# Patient Record
Sex: FEMALE | Race: WHITE | NOT HISPANIC OR LATINO | URBAN - METROPOLITAN AREA
[De-identification: names, ages, dates, MRNs, and addresses within clinical notes are randomized per-mention and may not be internally consistent; named-entity substitution may affect disease eponyms.]

---

## 2017-05-11 ENCOUNTER — EMERGENCY (EMERGENCY)
Facility: HOSPITAL | Age: 32
LOS: 0 days | Discharge: HOME | End: 2017-05-12

## 2017-06-28 DIAGNOSIS — R11.0 NAUSEA: ICD-10-CM

## 2017-06-28 DIAGNOSIS — R10.2 PELVIC AND PERINEAL PAIN: ICD-10-CM

## 2017-06-28 DIAGNOSIS — R10.30 LOWER ABDOMINAL PAIN, UNSPECIFIED: ICD-10-CM

## 2018-06-23 ENCOUNTER — TRANSCRIPTION ENCOUNTER (OUTPATIENT)
Age: 33
End: 2018-06-23

## 2018-06-26 ENCOUNTER — EMERGENCY (EMERGENCY)
Facility: HOSPITAL | Age: 33
LOS: 0 days | Discharge: HOME | End: 2018-06-27
Attending: EMERGENCY MEDICINE | Admitting: EMERGENCY MEDICINE

## 2018-06-26 VITALS
WEIGHT: 136.03 LBS | DIASTOLIC BLOOD PRESSURE: 92 MMHG | HEIGHT: 65 IN | RESPIRATION RATE: 20 BRPM | SYSTOLIC BLOOD PRESSURE: 132 MMHG | HEART RATE: 69 BPM | OXYGEN SATURATION: 99 % | TEMPERATURE: 98 F

## 2018-06-26 DIAGNOSIS — R07.9 CHEST PAIN, UNSPECIFIED: ICD-10-CM

## 2018-06-26 DIAGNOSIS — Z79.2 LONG TERM (CURRENT) USE OF ANTIBIOTICS: ICD-10-CM

## 2018-06-26 DIAGNOSIS — R07.89 OTHER CHEST PAIN: ICD-10-CM

## 2018-06-26 RX ORDER — SODIUM CHLORIDE 9 MG/ML
3 INJECTION INTRAMUSCULAR; INTRAVENOUS; SUBCUTANEOUS ONCE
Qty: 0 | Refills: 0 | Status: COMPLETED | OUTPATIENT
Start: 2018-06-26 | End: 2018-06-26

## 2018-06-26 RX ORDER — KETOROLAC TROMETHAMINE 30 MG/ML
15 SYRINGE (ML) INJECTION ONCE
Qty: 0 | Refills: 0 | Status: DISCONTINUED | OUTPATIENT
Start: 2018-06-26 | End: 2018-06-26

## 2018-06-26 RX ORDER — ASPIRIN/CALCIUM CARB/MAGNESIUM 324 MG
325 TABLET ORAL ONCE
Qty: 0 | Refills: 0 | Status: COMPLETED | OUTPATIENT
Start: 2018-06-26 | End: 2018-06-26

## 2018-06-26 RX ORDER — NITROFURANTOIN MACROCRYSTAL 50 MG
1 CAPSULE ORAL
Qty: 0 | Refills: 0 | COMMUNITY

## 2018-06-26 RX ADMIN — Medication 325 MILLIGRAM(S): at 23:46

## 2018-06-26 RX ADMIN — SODIUM CHLORIDE 3 MILLILITER(S): 9 INJECTION INTRAMUSCULAR; INTRAVENOUS; SUBCUTANEOUS at 23:46

## 2018-06-26 RX ADMIN — Medication 15 MILLIGRAM(S): at 23:46

## 2018-06-26 NOTE — ED PROVIDER NOTE - OBJECTIVE STATEMENT
33 year old female states that she has chest pain left sided that goes down her arm for the last 30min. patient states that its gotten better since emergency room arrival. no leg pain or swelling no shortness of breath. no palpiations.

## 2018-06-26 NOTE — ED PROVIDER NOTE - CHPI ED SYMPTOMS NEG
no shortness of breath/no fever/no cough/no syncope/no chills/no dizziness/no back pain/no vomiting/no nausea/no diaphoresis

## 2018-06-26 NOTE — ED PROVIDER NOTE - NS ED ROS FT
Constitutional: (-) fever  Eyes/ENT: (-) blurry vision, (-) epistaxis  Cardiovascular: (+) chest pain, (-) syncope  Respiratory: (-) cough, (-) shortness of breath  Gastrointestinal: (-) vomiting, (-) diarrhea  Musculoskeletal: (-) neck pain, (-) back pain, (-) joint pain  Integumentary: (-) rash, (-) edema  Neurological: (-) headache, (-) altered mental status  Psychiatric: (-) hallucinations  Allergic/Immunologic: (-) pruritus

## 2018-06-26 NOTE — ED PROVIDER NOTE - ATTENDING CONTRIBUTION TO CARE
33yoF with h/o larissa otherwise healthy presents with L-sided CP, constant, worsened by lifting her arm and not by exertion, x30 minutes prior to arrival. Associated SOB. No leg pain/swelling, recent travel, OCP use, or any other symptoms. On exam, afebrile, hemodynamically stable, saturating well, NAD, anxious, otherwise well appearing, no increased WOB, head NCAT, EOMI grossly, anicteric, MMM, no JVD, RRR, nml S1/S2, no m/r/g, lungs CTAB, no w/r/r, abd soft, NT, ND, nml BS, no rebound or guarding, AAO, CN's 3-12 grossly intact, ORTEGA spontaneously, no leg cyanosis or edema, skin warm, well perfused, no rashes or hives. Character low suspicion for ACS, HEART score 1 for FHx of MI in the 60's, ECG/trop _____. Character low suspicion for PE and PERC negative. Character low suspicion dissection and no murmur or pulse asymmetry. No e/o PTX or PNA. Given ASA/Toradol. Well appearing, hemodynamically stable. 33yoF with h/o larissa otherwise healthy presents with L-sided CP, constant, worsened by lifting her arm and not by exertion, x30 minutes prior to arrival. Associated SOB. No leg pain/swelling, recent travel, OCP use, or any other symptoms. On exam, afebrile, hemodynamically stable, saturating well, NAD, anxious, otherwise well appearing, no increased WOB, head NCAT, EOMI grossly, anicteric, MMM, no JVD, RRR, nml S1/S2, no m/r/g, lungs CTAB, no w/r/r, abd soft, NT, ND, nml BS, no rebound or guarding, AAO, CN's 3-12 grossly intact, ORTEGA spontaneously, no leg cyanosis or edema, skin warm, well perfused, no rashes or hives. Character low suspicion for ACS, HEART score 1 for FHx of MI in the 60's, ECG unremarkable, trop pending. Character low suspicion for PE and PERC negative. Character low suspicion dissection and no murmur or pulse asymmetry. No e/o PTX or PNA. Given ASA/Toradol. Well appearing, hemodynamically stable. Signed off care at this time Dr. JOEL Puckett who will f/u on trop and anticipate d/c with PMD f/u, pt comfortable with this plan.

## 2018-06-27 VITALS
DIASTOLIC BLOOD PRESSURE: 86 MMHG | OXYGEN SATURATION: 98 % | SYSTOLIC BLOOD PRESSURE: 126 MMHG | RESPIRATION RATE: 20 BRPM | TEMPERATURE: 98 F | HEART RATE: 70 BPM

## 2018-06-27 LAB
ALBUMIN SERPL ELPH-MCNC: 4.4 G/DL — SIGNIFICANT CHANGE UP (ref 3.5–5.2)
ALP SERPL-CCNC: 58 U/L — SIGNIFICANT CHANGE UP (ref 30–115)
ALT FLD-CCNC: 15 U/L — SIGNIFICANT CHANGE UP (ref 0–41)
ANION GAP SERPL CALC-SCNC: 15 MMOL/L — HIGH (ref 7–14)
AST SERPL-CCNC: 17 U/L — SIGNIFICANT CHANGE UP (ref 0–41)
BASOPHILS # BLD AUTO: 0.03 K/UL — SIGNIFICANT CHANGE UP (ref 0–0.2)
BASOPHILS NFR BLD AUTO: 0.4 % — SIGNIFICANT CHANGE UP (ref 0–1)
BILIRUB SERPL-MCNC: 0.3 MG/DL — SIGNIFICANT CHANGE UP (ref 0.2–1.2)
BUN SERPL-MCNC: 17 MG/DL — SIGNIFICANT CHANGE UP (ref 10–20)
CALCIUM SERPL-MCNC: 9 MG/DL — SIGNIFICANT CHANGE UP (ref 8.5–10.1)
CHLORIDE SERPL-SCNC: 103 MMOL/L — SIGNIFICANT CHANGE UP (ref 98–110)
CK SERPL-CCNC: 85 U/L — SIGNIFICANT CHANGE UP (ref 0–225)
CO2 SERPL-SCNC: 21 MMOL/L — SIGNIFICANT CHANGE UP (ref 17–32)
CREAT SERPL-MCNC: 0.8 MG/DL — SIGNIFICANT CHANGE UP (ref 0.7–1.5)
EOSINOPHIL # BLD AUTO: 0.24 K/UL — SIGNIFICANT CHANGE UP (ref 0–0.7)
EOSINOPHIL NFR BLD AUTO: 3.2 % — SIGNIFICANT CHANGE UP (ref 0–8)
GLUCOSE SERPL-MCNC: 106 MG/DL — HIGH (ref 70–99)
HCT VFR BLD CALC: 37.5 % — SIGNIFICANT CHANGE UP (ref 37–47)
HGB BLD-MCNC: 12.8 G/DL — SIGNIFICANT CHANGE UP (ref 12–16)
IMM GRANULOCYTES NFR BLD AUTO: 0.4 % — HIGH (ref 0.1–0.3)
LYMPHOCYTES # BLD AUTO: 3.27 K/UL — SIGNIFICANT CHANGE UP (ref 1.2–3.4)
LYMPHOCYTES # BLD AUTO: 43.3 % — SIGNIFICANT CHANGE UP (ref 20.5–51.1)
MCHC RBC-ENTMCNC: 30.3 PG — SIGNIFICANT CHANGE UP (ref 27–31)
MCHC RBC-ENTMCNC: 34.1 G/DL — SIGNIFICANT CHANGE UP (ref 32–37)
MCV RBC AUTO: 88.7 FL — SIGNIFICANT CHANGE UP (ref 81–99)
MONOCYTES # BLD AUTO: 0.52 K/UL — SIGNIFICANT CHANGE UP (ref 0.1–0.6)
MONOCYTES NFR BLD AUTO: 6.9 % — SIGNIFICANT CHANGE UP (ref 1.7–9.3)
NEUTROPHILS # BLD AUTO: 3.47 K/UL — SIGNIFICANT CHANGE UP (ref 1.4–6.5)
NEUTROPHILS NFR BLD AUTO: 45.8 % — SIGNIFICANT CHANGE UP (ref 42.2–75.2)
NRBC # BLD: 0 /100 WBCS — SIGNIFICANT CHANGE UP (ref 0–0)
PLATELET # BLD AUTO: 265 K/UL — SIGNIFICANT CHANGE UP (ref 130–400)
POTASSIUM SERPL-MCNC: 3.9 MMOL/L — SIGNIFICANT CHANGE UP (ref 3.5–5)
POTASSIUM SERPL-SCNC: 3.9 MMOL/L — SIGNIFICANT CHANGE UP (ref 3.5–5)
PROT SERPL-MCNC: 7.1 G/DL — SIGNIFICANT CHANGE UP (ref 6–8)
RBC # BLD: 4.23 M/UL — SIGNIFICANT CHANGE UP (ref 4.2–5.4)
RBC # FLD: 11.9 % — SIGNIFICANT CHANGE UP (ref 11.5–14.5)
SODIUM SERPL-SCNC: 139 MMOL/L — SIGNIFICANT CHANGE UP (ref 135–146)
TROPONIN T SERPL-MCNC: <0.01 NG/ML — SIGNIFICANT CHANGE UP
WBC # BLD: 7.56 K/UL — SIGNIFICANT CHANGE UP (ref 4.8–10.8)
WBC # FLD AUTO: 7.56 K/UL — SIGNIFICANT CHANGE UP (ref 4.8–10.8)

## 2018-08-01 PROBLEM — Z00.00 ENCOUNTER FOR PREVENTIVE HEALTH EXAMINATION: Status: ACTIVE | Noted: 2018-08-01

## 2018-09-24 ENCOUNTER — APPOINTMENT (OUTPATIENT)
Dept: OBGYN | Facility: CLINIC | Age: 33
End: 2018-09-24

## 2019-09-04 ENCOUNTER — EMERGENCY (EMERGENCY)
Facility: HOSPITAL | Age: 34
LOS: 0 days | Discharge: HOME | End: 2019-09-05
Attending: EMERGENCY MEDICINE | Admitting: EMERGENCY MEDICINE
Payer: MEDICAID

## 2019-09-04 VITALS
HEIGHT: 65 IN | DIASTOLIC BLOOD PRESSURE: 62 MMHG | HEART RATE: 67 BPM | RESPIRATION RATE: 20 BRPM | SYSTOLIC BLOOD PRESSURE: 120 MMHG | OXYGEN SATURATION: 100 % | WEIGHT: 134.92 LBS | TEMPERATURE: 98 F

## 2019-09-04 VITALS
RESPIRATION RATE: 18 BRPM | TEMPERATURE: 97 F | HEART RATE: 62 BPM | DIASTOLIC BLOOD PRESSURE: 65 MMHG | SYSTOLIC BLOOD PRESSURE: 103 MMHG | OXYGEN SATURATION: 100 %

## 2019-09-04 DIAGNOSIS — R10.9 UNSPECIFIED ABDOMINAL PAIN: ICD-10-CM

## 2019-09-04 DIAGNOSIS — N83.201 UNSPECIFIED OVARIAN CYST, RIGHT SIDE: ICD-10-CM

## 2019-09-04 DIAGNOSIS — R10.2 PELVIC AND PERINEAL PAIN: ICD-10-CM

## 2019-09-04 DIAGNOSIS — R10.32 LEFT LOWER QUADRANT PAIN: ICD-10-CM

## 2019-09-04 LAB
ALBUMIN SERPL ELPH-MCNC: 4.4 G/DL — SIGNIFICANT CHANGE UP (ref 3.5–5.2)
ALP SERPL-CCNC: 53 U/L — SIGNIFICANT CHANGE UP (ref 30–115)
ALT FLD-CCNC: 7 U/L — SIGNIFICANT CHANGE UP (ref 0–41)
ANION GAP SERPL CALC-SCNC: 8 MMOL/L — SIGNIFICANT CHANGE UP (ref 7–14)
APPEARANCE UR: CLEAR — SIGNIFICANT CHANGE UP
APTT BLD: 30.7 SEC — SIGNIFICANT CHANGE UP (ref 27–39.2)
AST SERPL-CCNC: 13 U/L — SIGNIFICANT CHANGE UP (ref 0–41)
BACTERIA # UR AUTO: ABNORMAL
BASOPHILS # BLD AUTO: 0.04 K/UL — SIGNIFICANT CHANGE UP (ref 0–0.2)
BASOPHILS NFR BLD AUTO: 0.6 % — SIGNIFICANT CHANGE UP (ref 0–1)
BILIRUB SERPL-MCNC: 0.4 MG/DL — SIGNIFICANT CHANGE UP (ref 0.2–1.2)
BILIRUB UR-MCNC: NEGATIVE — SIGNIFICANT CHANGE UP
BUN SERPL-MCNC: 15 MG/DL — SIGNIFICANT CHANGE UP (ref 10–20)
CALCIUM SERPL-MCNC: 9.4 MG/DL — SIGNIFICANT CHANGE UP (ref 8.5–10.1)
CHLORIDE SERPL-SCNC: 102 MMOL/L — SIGNIFICANT CHANGE UP (ref 98–110)
CO2 SERPL-SCNC: 26 MMOL/L — SIGNIFICANT CHANGE UP (ref 17–32)
COLOR SPEC: YELLOW — SIGNIFICANT CHANGE UP
CREAT SERPL-MCNC: 0.6 MG/DL — LOW (ref 0.7–1.5)
DIFF PNL FLD: NEGATIVE — SIGNIFICANT CHANGE UP
EOSINOPHIL # BLD AUTO: 0.07 K/UL — SIGNIFICANT CHANGE UP (ref 0–0.7)
EOSINOPHIL NFR BLD AUTO: 1.1 % — SIGNIFICANT CHANGE UP (ref 0–8)
EPI CELLS # UR: ABNORMAL /HPF
GLUCOSE SERPL-MCNC: 102 MG/DL — HIGH (ref 70–99)
GLUCOSE UR QL: NEGATIVE MG/DL — SIGNIFICANT CHANGE UP
HCT VFR BLD CALC: 35.4 % — LOW (ref 37–47)
HGB BLD-MCNC: 11.7 G/DL — LOW (ref 12–16)
IMM GRANULOCYTES NFR BLD AUTO: 0.2 % — SIGNIFICANT CHANGE UP (ref 0.1–0.3)
INR BLD: 1.08 RATIO — SIGNIFICANT CHANGE UP (ref 0.65–1.3)
KETONES UR-MCNC: NEGATIVE — SIGNIFICANT CHANGE UP
LACTATE SERPL-SCNC: 1.1 MMOL/L — SIGNIFICANT CHANGE UP (ref 0.5–2.2)
LEUKOCYTE ESTERASE UR-ACNC: ABNORMAL
LIDOCAIN IGE QN: 41 U/L — SIGNIFICANT CHANGE UP (ref 7–60)
LYMPHOCYTES # BLD AUTO: 2.48 K/UL — SIGNIFICANT CHANGE UP (ref 1.2–3.4)
LYMPHOCYTES # BLD AUTO: 39.8 % — SIGNIFICANT CHANGE UP (ref 20.5–51.1)
MAGNESIUM SERPL-MCNC: 1.9 MG/DL — SIGNIFICANT CHANGE UP (ref 1.8–2.4)
MCHC RBC-ENTMCNC: 29.3 PG — SIGNIFICANT CHANGE UP (ref 27–31)
MCHC RBC-ENTMCNC: 33.1 G/DL — SIGNIFICANT CHANGE UP (ref 32–37)
MCV RBC AUTO: 88.7 FL — SIGNIFICANT CHANGE UP (ref 81–99)
MONOCYTES # BLD AUTO: 0.5 K/UL — SIGNIFICANT CHANGE UP (ref 0.1–0.6)
MONOCYTES NFR BLD AUTO: 8 % — SIGNIFICANT CHANGE UP (ref 1.7–9.3)
NEUTROPHILS # BLD AUTO: 3.13 K/UL — SIGNIFICANT CHANGE UP (ref 1.4–6.5)
NEUTROPHILS NFR BLD AUTO: 50.3 % — SIGNIFICANT CHANGE UP (ref 42.2–75.2)
NITRITE UR-MCNC: NEGATIVE — SIGNIFICANT CHANGE UP
NRBC # BLD: 0 /100 WBCS — SIGNIFICANT CHANGE UP (ref 0–0)
PH UR: 7 — SIGNIFICANT CHANGE UP (ref 5–8)
PLATELET # BLD AUTO: 283 K/UL — SIGNIFICANT CHANGE UP (ref 130–400)
POTASSIUM SERPL-MCNC: 4.1 MMOL/L — SIGNIFICANT CHANGE UP (ref 3.5–5)
POTASSIUM SERPL-SCNC: 4.1 MMOL/L — SIGNIFICANT CHANGE UP (ref 3.5–5)
PROT SERPL-MCNC: 6.7 G/DL — SIGNIFICANT CHANGE UP (ref 6–8)
PROT UR-MCNC: NEGATIVE MG/DL — SIGNIFICANT CHANGE UP
PROTHROM AB SERPL-ACNC: 12.4 SEC — SIGNIFICANT CHANGE UP (ref 9.95–12.87)
RBC # BLD: 3.99 M/UL — LOW (ref 4.2–5.4)
RBC # FLD: 12.2 % — SIGNIFICANT CHANGE UP (ref 11.5–14.5)
RBC CASTS # UR COMP ASSIST: SIGNIFICANT CHANGE UP /HPF
SODIUM SERPL-SCNC: 136 MMOL/L — SIGNIFICANT CHANGE UP (ref 135–146)
SP GR SPEC: 1.01 — SIGNIFICANT CHANGE UP (ref 1.01–1.03)
UROBILINOGEN FLD QL: 0.2 MG/DL — SIGNIFICANT CHANGE UP (ref 0.2–0.2)
WBC # BLD: 6.23 K/UL — SIGNIFICANT CHANGE UP (ref 4.8–10.8)
WBC # FLD AUTO: 6.23 K/UL — SIGNIFICANT CHANGE UP (ref 4.8–10.8)
WBC UR QL: SIGNIFICANT CHANGE UP /HPF

## 2019-09-04 PROCEDURE — 74177 CT ABD & PELVIS W/CONTRAST: CPT | Mod: 26

## 2019-09-04 PROCEDURE — 99285 EMERGENCY DEPT VISIT HI MDM: CPT

## 2019-09-04 PROCEDURE — 76856 US EXAM PELVIC COMPLETE: CPT | Mod: 26

## 2019-09-04 RX ORDER — KETOROLAC TROMETHAMINE 30 MG/ML
15 SYRINGE (ML) INJECTION ONCE
Refills: 0 | Status: DISCONTINUED | OUTPATIENT
Start: 2019-09-04 | End: 2019-09-04

## 2019-09-04 RX ORDER — MORPHINE SULFATE 50 MG/1
4 CAPSULE, EXTENDED RELEASE ORAL ONCE
Refills: 0 | Status: DISCONTINUED | OUTPATIENT
Start: 2019-09-04 | End: 2019-09-04

## 2019-09-04 RX ORDER — SODIUM CHLORIDE 9 MG/ML
1000 INJECTION INTRAMUSCULAR; INTRAVENOUS; SUBCUTANEOUS ONCE
Refills: 0 | Status: COMPLETED | OUTPATIENT
Start: 2019-09-04 | End: 2019-09-04

## 2019-09-04 RX ADMIN — SODIUM CHLORIDE 1000 MILLILITER(S): 9 INJECTION INTRAMUSCULAR; INTRAVENOUS; SUBCUTANEOUS at 19:39

## 2019-09-04 RX ADMIN — MORPHINE SULFATE 4 MILLIGRAM(S): 50 CAPSULE, EXTENDED RELEASE ORAL at 19:39

## 2019-09-04 NOTE — ED ADULT TRIAGE NOTE - CHIEF COMPLAINT QUOTE
pt stated " c/o lower abdominal and lower back pain for the past two days, it was the worst today" pt took Ibuprofen 600mg 1 hour ago.

## 2019-09-04 NOTE — ED PROVIDER NOTE - NSFOLLOWUPINSTRUCTIONS_ED_ALL_ED_FT
Abdominal Pain, Adult  ImageMany things can cause belly (abdominal) pain. Most times, belly pain is not dangerous. Many cases of belly pain can be watched and treated at home. Sometimes belly pain is serious, though. Your doctor will try to find the cause of your belly pain.    Follow these instructions at home:  Take over-the-counter and prescription medicines only as told by your doctor. Do not take medicines that help you poop (laxatives) unless told to by your doctor.  Drink enough fluid to keep your pee (urine) clear or pale yellow.  Watch your belly pain for any changes.  Keep all follow-up visits as told by your doctor. This is important.  Contact a doctor if:  Your belly pain changes or gets worse.  You are not hungry, or you lose weight without trying.  You are having trouble pooping (constipated) or have watery poop (diarrhea) for more than 2–3 days.  You have pain when you pee or poop.  Your belly pain wakes you up at night.  Your pain gets worse with meals, after eating, or with certain foods.  You are throwing up and cannot keep anything down.  You have a fever.  Get help right away if:  Your pain does not go away as soon as your doctor says it should.  You cannot stop throwing up.  Your pain is only in areas of your belly, such as the right side or the left lower part of the belly.  You have bloody or black poop, or poop that looks like tar.  You have very bad pain, cramping, or bloating in your belly.  You have signs of not having enough fluid or water in your body (dehydration), such as:    Dark pee, very little pee, or no pee.  Cracked lips.  Dry mouth.  Sunken eyes.  Sleepiness.  Weakness.    This information is not intended to replace advice given to you by your health care provider. Make sure you discuss any questions you have with your health care provider.    Document Released: 06/05/2009 Document Revised: 07/07/2017 Document Reviewed: 05/31/2017  [employer] Interactive Patient Education © 2018 [employer]

## 2019-09-04 NOTE — ED PROVIDER NOTE - ATTENDING CONTRIBUTION TO CARE
33 yo F , LMP 1 week ago presents with c/o left sided lower abd pain x 3 days.  Pt now radiating toward her back.  Pt with h/o back pain in past, but this feels similar,  + nausea, no vomiting, no diarrhea, no urinary complaints, no fever or chills.  no vaginal d/c.  On exam pt in NAD AAO x 3, MMM, abd is soft ND, + tender LLQ, no rebound or guarding, no CVA tenderness, no rash

## 2019-09-04 NOTE — ED PROVIDER NOTE - CLINICAL SUMMARY MEDICAL DECISION MAKING FREE TEXT BOX
pt pw  left pelvic  pain US , ovarian cs 1.3 cm right side,  CT ap  constipation  , no  inflammatory process -   Patient to be discharged from ED. Any available test results were discussed with and printed  for patient.  Verbal instructions given, including instructions to return to ED immediately for any new, worsening, or concerning symptoms. Patient reports understanding of above with capacity and insight. Written discharge instructions additionally given, including follow-up plan.

## 2019-09-04 NOTE — ED PROVIDER NOTE - PATIENT PORTAL LINK FT
You can access the FollowMyHealth Patient Portal offered by [facility] by registering at the following website: [email or web address]. By joining [employer]’s FollowMyHealth portal, you will also be able to view your health information using other applications (apps) compatible with our system.

## 2019-09-04 NOTE — ED PROVIDER NOTE - OBJECTIVE STATEMENT
Patient c/o of LLQ abdominal pain, started 3 days ago  now radiating to lower back, + nausea, no vaginal discharge or bleeding, No fever, LMP finished 2 days ago and was normal.

## 2019-09-04 NOTE — ED PROVIDER NOTE - PROGRESS NOTE DETAILS
signed out Dr Puckett pt  well appearing  asymptomatic - awaiting  CT  results  abd soft nontender   discussed results  that are  back with patient

## 2019-09-05 RX ADMIN — Medication 15 MILLIGRAM(S): at 00:21

## 2019-09-12 ENCOUNTER — APPOINTMENT (OUTPATIENT)
Dept: OBGYN | Facility: CLINIC | Age: 34
End: 2019-09-12
Payer: COMMERCIAL

## 2019-09-12 VITALS
SYSTOLIC BLOOD PRESSURE: 110 MMHG | HEIGHT: 65 IN | DIASTOLIC BLOOD PRESSURE: 66 MMHG | WEIGHT: 134 LBS | BODY MASS INDEX: 22.33 KG/M2

## 2019-09-12 DIAGNOSIS — Z01.411 ENCOUNTER FOR GYNECOLOGICAL EXAMINATION (GENERAL) (ROUTINE) WITH ABNORMAL FINDINGS: ICD-10-CM

## 2019-09-12 DIAGNOSIS — K59.00 CONSTIPATION, UNSPECIFIED: ICD-10-CM

## 2019-09-12 DIAGNOSIS — R10.2 PELVIC AND PERINEAL PAIN: ICD-10-CM

## 2019-09-12 DIAGNOSIS — A74.9 CHLAMYDIAL INFECTION, UNSPECIFIED: ICD-10-CM

## 2019-09-12 DIAGNOSIS — N83.201 UNSPECIFIED OVARIAN CYST, RIGHT SIDE: ICD-10-CM

## 2019-09-12 PROCEDURE — 99213 OFFICE O/P EST LOW 20 MIN: CPT | Mod: 25

## 2019-09-12 PROCEDURE — 99395 PREV VISIT EST AGE 18-39: CPT

## 2019-09-12 PROCEDURE — 76830 TRANSVAGINAL US NON-OB: CPT

## 2019-09-12 RX ORDER — DOXYCYCLINE 100 MG/1
100 CAPSULE ORAL
Qty: 60 | Refills: 0 | Status: COMPLETED | COMMUNITY
Start: 2019-06-10

## 2019-09-12 RX ORDER — AMOXICILLIN AND CLAVULANATE POTASSIUM 875; 125 MG/1; MG/1
875-125 TABLET, COATED ORAL
Qty: 14 | Refills: 0 | Status: COMPLETED | COMMUNITY
Start: 2019-06-25

## 2019-09-12 RX ORDER — AZITHROMYCIN 500 MG/1
500 TABLET, FILM COATED ORAL
Qty: 4 | Refills: 0 | Status: COMPLETED | COMMUNITY
Start: 2019-09-07

## 2019-09-12 RX ORDER — MUPIROCIN 20 MG/G
2 OINTMENT TOPICAL
Qty: 22 | Refills: 0 | Status: COMPLETED | COMMUNITY
Start: 2019-06-28

## 2019-09-12 RX ORDER — TRETINOIN 0.5 MG/G
0.05 CREAM TOPICAL
Qty: 20 | Refills: 0 | Status: COMPLETED | COMMUNITY
Start: 2019-07-23

## 2019-09-12 RX ORDER — TRETINOIN 0.25 MG/G
0.03 CREAM TOPICAL
Qty: 20 | Refills: 0 | Status: COMPLETED | COMMUNITY
Start: 2019-06-10

## 2019-09-12 RX ORDER — ALPRAZOLAM 1 MG/1
1 TABLET ORAL
Qty: 60 | Refills: 0 | Status: ACTIVE | COMMUNITY
Start: 2019-07-22

## 2019-09-12 RX ORDER — AZITHROMYCIN 250 MG/1
250 TABLET, FILM COATED ORAL
Qty: 6 | Refills: 0 | Status: COMPLETED | COMMUNITY
Start: 2019-05-21

## 2019-09-12 RX ORDER — CLINDAMYCIN PHOSPHATE 10 MG/ML
1 LOTION TOPICAL
Qty: 60 | Refills: 0 | Status: COMPLETED | COMMUNITY
Start: 2019-06-10

## 2019-09-12 RX ORDER — DOXYCYCLINE HYCLATE 100 MG/1
100 TABLET ORAL
Qty: 14 | Refills: 0 | Status: COMPLETED | COMMUNITY
Start: 2019-09-12 | End: 2019-09-19

## 2019-09-12 RX ORDER — GENTAMICIN SULFATE 1 MG/G
0.1 OINTMENT TOPICAL
Qty: 15 | Refills: 0 | Status: COMPLETED | COMMUNITY
Start: 2019-07-23

## 2019-09-12 RX ORDER — NITROFURANTOIN (MONOHYDRATE/MACROCRYSTALS) 25; 75 MG/1; MG/1
100 CAPSULE ORAL
Qty: 14 | Refills: 0 | Status: COMPLETED | COMMUNITY
Start: 2019-07-10

## 2019-09-12 NOTE — PROCEDURE
[Liquid Base] : liquid base [Cervical Pap Smear] : cervical Pap smear [No Complications] : there were no complications [Tolerated Well] : the patient tolerated the procedure well [Pelvic Pain] : pelvic pain [Transvaginal Ultrasound] : transvaginal ultrasound [Present] : uterus present [Anteverted] : anteverted [L: ___ cm] : L: [unfilled] cm [W: ___cm] : W: [unfilled] cm [FreeTextEntry6] : Right ovarian cyst 1.2 x 1.6 cm [FreeTextEntry7] : 2.1 x 3.7 cm [FreeTextEntry8] : 1.8 x 3.1 cm

## 2019-09-19 ENCOUNTER — APPOINTMENT (OUTPATIENT)
Dept: OBGYN | Facility: CLINIC | Age: 34
End: 2019-09-19
Payer: MEDICAID

## 2019-09-19 VITALS — SYSTOLIC BLOOD PRESSURE: 112 MMHG | BODY MASS INDEX: 22.3 KG/M2 | WEIGHT: 134 LBS | DIASTOLIC BLOOD PRESSURE: 74 MMHG

## 2019-09-19 DIAGNOSIS — R87.615 UNSATISFACTORY CYTOLOGIC SMEAR OF CERVIX: ICD-10-CM

## 2019-09-19 PROCEDURE — XXXXX: CPT

## 2019-09-19 RX ORDER — SULFAMETHOXAZOLE AND TRIMETHOPRIM 800; 160 MG/1; MG/1
800-160 TABLET ORAL
Qty: 20 | Refills: 0 | Status: ACTIVE | COMMUNITY
Start: 2019-09-08

## 2019-09-19 RX ORDER — PHENAZOPYRIDINE HYDROCHLORIDE 200 MG/1
200 TABLET ORAL
Qty: 6 | Refills: 0 | Status: ACTIVE | COMMUNITY
Start: 2019-09-11

## 2019-09-19 RX ORDER — METRONIDAZOLE 7.5 MG/G
0.75 GEL VAGINAL
Qty: 70 | Refills: 0 | Status: ACTIVE | COMMUNITY
Start: 2019-09-11

## 2019-09-19 RX ORDER — MULTIVIT,STRESS FORMULA/ZINC
TABLET ORAL
Refills: 0 | Status: ACTIVE | COMMUNITY

## 2019-11-04 ENCOUNTER — APPOINTMENT (OUTPATIENT)
Dept: OBGYN | Facility: CLINIC | Age: 34
End: 2019-11-04

## 2019-12-12 ENCOUNTER — APPOINTMENT (OUTPATIENT)
Dept: OBGYN | Facility: CLINIC | Age: 34
End: 2019-12-12

## 2020-01-21 ENCOUNTER — APPOINTMENT (OUTPATIENT)
Dept: OBGYN | Facility: CLINIC | Age: 35
End: 2020-01-21
Payer: MEDICAID

## 2020-01-21 VITALS — SYSTOLIC BLOOD PRESSURE: 108 MMHG | WEIGHT: 142 LBS | DIASTOLIC BLOOD PRESSURE: 70 MMHG | BODY MASS INDEX: 23.63 KG/M2

## 2020-01-21 DIAGNOSIS — N94.6 DYSMENORRHEA, UNSPECIFIED: ICD-10-CM

## 2020-01-21 DIAGNOSIS — N92.6 IRREGULAR MENSTRUATION, UNSPECIFIED: ICD-10-CM

## 2020-01-21 DIAGNOSIS — Z30.011 ENCOUNTER FOR INITIAL PRESCRIPTION OF CONTRACEPTIVE PILLS: ICD-10-CM

## 2020-01-21 DIAGNOSIS — Z11.3 ENCOUNTER FOR SCREENING FOR INFECTIONS WITH A PREDOMINANTLY SEXUAL MODE OF TRANSMISSION: ICD-10-CM

## 2020-01-21 DIAGNOSIS — Z86.19 PERSONAL HISTORY OF OTHER INFECTIOUS AND PARASITIC DISEASES: ICD-10-CM

## 2020-01-21 DIAGNOSIS — N89.8 OTHER SPECIFIED NONINFLAMMATORY DISORDERS OF VAGINA: ICD-10-CM

## 2020-01-21 PROCEDURE — 99214 OFFICE O/P EST MOD 30 MIN: CPT

## 2020-01-21 RX ORDER — DROSPIRENONE AND ETHINYL ESTRADIOL 0.02-3(28)
3-0.02 KIT ORAL DAILY
Qty: 3 | Refills: 1 | Status: ACTIVE | COMMUNITY
Start: 2020-01-21 | End: 1900-01-01

## 2020-01-23 RX ORDER — NORETHINDRONE ACETATE AND ETHINYL ESTRADIOL AND FERROUS FUMARATE 1MG-20(21)
1-20 KIT ORAL DAILY
Qty: 3 | Refills: 1 | Status: ACTIVE | COMMUNITY
Start: 2020-01-23 | End: 1900-01-01

## 2020-02-06 NOTE — ED PROVIDER NOTE - NS ED ATTENDING STATEMENT MOD
Home
I have personally performed a face to face diagnostic evaluation on this patient. I have reviewed the ACP note and agree with the history, exam and plan of care, except as noted.

## 2020-02-09 ENCOUNTER — EMERGENCY (EMERGENCY)
Facility: HOSPITAL | Age: 35
LOS: 0 days | Discharge: HOME | End: 2020-02-09
Attending: EMERGENCY MEDICINE | Admitting: EMERGENCY MEDICINE
Payer: MEDICAID

## 2020-02-09 VITALS
RESPIRATION RATE: 18 BRPM | HEART RATE: 62 BPM | TEMPERATURE: 98 F | OXYGEN SATURATION: 99 % | SYSTOLIC BLOOD PRESSURE: 111 MMHG | DIASTOLIC BLOOD PRESSURE: 73 MMHG

## 2020-02-09 VITALS
SYSTOLIC BLOOD PRESSURE: 119 MMHG | TEMPERATURE: 99 F | HEIGHT: 65 IN | DIASTOLIC BLOOD PRESSURE: 80 MMHG | WEIGHT: 132.06 LBS | HEART RATE: 65 BPM | OXYGEN SATURATION: 100 % | RESPIRATION RATE: 18 BRPM

## 2020-02-09 DIAGNOSIS — M54.5 LOW BACK PAIN: ICD-10-CM

## 2020-02-09 DIAGNOSIS — R10.9 UNSPECIFIED ABDOMINAL PAIN: ICD-10-CM

## 2020-02-09 LAB
ALBUMIN SERPL ELPH-MCNC: 4.7 G/DL — SIGNIFICANT CHANGE UP (ref 3.5–5.2)
ALP SERPL-CCNC: 55 U/L — SIGNIFICANT CHANGE UP (ref 30–115)
ALT FLD-CCNC: 15 U/L — SIGNIFICANT CHANGE UP (ref 0–41)
ANION GAP SERPL CALC-SCNC: 12 MMOL/L — SIGNIFICANT CHANGE UP (ref 7–14)
APPEARANCE UR: CLEAR — SIGNIFICANT CHANGE UP
AST SERPL-CCNC: 15 U/L — SIGNIFICANT CHANGE UP (ref 0–41)
BACTERIA # UR AUTO: ABNORMAL
BASOPHILS # BLD AUTO: 0.02 K/UL — SIGNIFICANT CHANGE UP (ref 0–0.2)
BASOPHILS NFR BLD AUTO: 0.3 % — SIGNIFICANT CHANGE UP (ref 0–1)
BILIRUB SERPL-MCNC: 0.6 MG/DL — SIGNIFICANT CHANGE UP (ref 0.2–1.2)
BILIRUB UR-MCNC: NEGATIVE — SIGNIFICANT CHANGE UP
BUN SERPL-MCNC: 9 MG/DL — LOW (ref 10–20)
CALCIUM SERPL-MCNC: 9.1 MG/DL — SIGNIFICANT CHANGE UP (ref 8.5–10.1)
CHLORIDE SERPL-SCNC: 106 MMOL/L — SIGNIFICANT CHANGE UP (ref 98–110)
CO2 SERPL-SCNC: 23 MMOL/L — SIGNIFICANT CHANGE UP (ref 17–32)
COLOR SPEC: YELLOW — SIGNIFICANT CHANGE UP
CREAT SERPL-MCNC: 0.7 MG/DL — SIGNIFICANT CHANGE UP (ref 0.7–1.5)
DIFF PNL FLD: NEGATIVE — SIGNIFICANT CHANGE UP
EOSINOPHIL # BLD AUTO: 0.08 K/UL — SIGNIFICANT CHANGE UP (ref 0–0.7)
EOSINOPHIL NFR BLD AUTO: 1.2 % — SIGNIFICANT CHANGE UP (ref 0–8)
EPI CELLS # UR: ABNORMAL /HPF
GLUCOSE SERPL-MCNC: 99 MG/DL — SIGNIFICANT CHANGE UP (ref 70–99)
GLUCOSE UR QL: NEGATIVE MG/DL — SIGNIFICANT CHANGE UP
HCT VFR BLD CALC: 38.8 % — SIGNIFICANT CHANGE UP (ref 37–47)
HGB BLD-MCNC: 13 G/DL — SIGNIFICANT CHANGE UP (ref 12–16)
IMM GRANULOCYTES NFR BLD AUTO: 0.3 % — SIGNIFICANT CHANGE UP (ref 0.1–0.3)
KETONES UR-MCNC: NEGATIVE — SIGNIFICANT CHANGE UP
LEUKOCYTE ESTERASE UR-ACNC: ABNORMAL
LYMPHOCYTES # BLD AUTO: 2.06 K/UL — SIGNIFICANT CHANGE UP (ref 1.2–3.4)
LYMPHOCYTES # BLD AUTO: 31.1 % — SIGNIFICANT CHANGE UP (ref 20.5–51.1)
MCHC RBC-ENTMCNC: 29.7 PG — SIGNIFICANT CHANGE UP (ref 27–31)
MCHC RBC-ENTMCNC: 33.5 G/DL — SIGNIFICANT CHANGE UP (ref 32–37)
MCV RBC AUTO: 88.8 FL — SIGNIFICANT CHANGE UP (ref 81–99)
MONOCYTES # BLD AUTO: 0.49 K/UL — SIGNIFICANT CHANGE UP (ref 0.1–0.6)
MONOCYTES NFR BLD AUTO: 7.4 % — SIGNIFICANT CHANGE UP (ref 1.7–9.3)
NEUTROPHILS # BLD AUTO: 3.96 K/UL — SIGNIFICANT CHANGE UP (ref 1.4–6.5)
NEUTROPHILS NFR BLD AUTO: 59.7 % — SIGNIFICANT CHANGE UP (ref 42.2–75.2)
NITRITE UR-MCNC: NEGATIVE — SIGNIFICANT CHANGE UP
NRBC # BLD: 0 /100 WBCS — SIGNIFICANT CHANGE UP (ref 0–0)
PH UR: 5.5 — SIGNIFICANT CHANGE UP (ref 5–8)
PLATELET # BLD AUTO: 307 K/UL — SIGNIFICANT CHANGE UP (ref 130–400)
POTASSIUM SERPL-MCNC: 4.1 MMOL/L — SIGNIFICANT CHANGE UP (ref 3.5–5)
POTASSIUM SERPL-SCNC: 4.1 MMOL/L — SIGNIFICANT CHANGE UP (ref 3.5–5)
PROT SERPL-MCNC: 7 G/DL — SIGNIFICANT CHANGE UP (ref 6–8)
PROT UR-MCNC: NEGATIVE MG/DL — SIGNIFICANT CHANGE UP
RBC # BLD: 4.37 M/UL — SIGNIFICANT CHANGE UP (ref 4.2–5.4)
RBC # FLD: 12.2 % — SIGNIFICANT CHANGE UP (ref 11.5–14.5)
SODIUM SERPL-SCNC: 141 MMOL/L — SIGNIFICANT CHANGE UP (ref 135–146)
SP GR SPEC: >=1.03 (ref 1.01–1.03)
UROBILINOGEN FLD QL: 0.2 MG/DL — SIGNIFICANT CHANGE UP (ref 0.2–0.2)
WBC # BLD: 6.63 K/UL — SIGNIFICANT CHANGE UP (ref 4.8–10.8)
WBC # FLD AUTO: 6.63 K/UL — SIGNIFICANT CHANGE UP (ref 4.8–10.8)
WBC UR QL: SIGNIFICANT CHANGE UP /HPF

## 2020-02-09 PROCEDURE — 99284 EMERGENCY DEPT VISIT MOD MDM: CPT

## 2020-02-09 PROCEDURE — 76856 US EXAM PELVIC COMPLETE: CPT | Mod: 26

## 2020-02-09 RX ORDER — DEXAMETHASONE 0.5 MG/5ML
10 ELIXIR ORAL ONCE
Refills: 0 | Status: COMPLETED | OUTPATIENT
Start: 2020-02-09 | End: 2020-02-09

## 2020-02-09 RX ORDER — KETOROLAC TROMETHAMINE 30 MG/ML
30 SYRINGE (ML) INJECTION ONCE
Refills: 0 | Status: DISCONTINUED | OUTPATIENT
Start: 2020-02-09 | End: 2020-02-09

## 2020-02-09 RX ADMIN — Medication 10 MILLIGRAM(S): at 18:22

## 2020-02-09 RX ADMIN — Medication 30 MILLIGRAM(S): at 16:03

## 2020-02-09 NOTE — ED PROVIDER NOTE - PATIENT PORTAL LINK FT
You can access the FollowMyHealth Patient Portal offered by [facility] by registering at the following website: [email or web address]. By joining [employer]’s FollowMyHealth portal, you will also be able to view your health information using other applications (apps) compatible with our system.

## 2020-02-09 NOTE — ED PROVIDER NOTE - OBJECTIVE STATEMENT
33 y/o female presents to the Ed with left sided lower abdominal pain. patient LMP one week ago. patient with hx of ovarian cyst . patient seen in the Ed for similar in past. patient c/o left buttock pain sharp in nature and intermittent . patient denies any heavy lifting or tingling down leg. patient denies any saddle anesthesia or bladder incontinence. no dysuria

## 2020-02-09 NOTE — ED PROVIDER NOTE - NSFOLLOWUPINSTRUCTIONS_ED_ALL_ED_FT
Back Pain    Back pain is very common in adults. The cause of back pain is rarely dangerous and the pain often gets better over time. The cause of your back pain may not be known and may include strain of muscles or ligaments, degeneration of the spinal disks, or arthritis. Occasionally the pain may radiate down your leg(s). Over-the-counter medicines to reduce pain and inflammation are often the most helpful. Stretching and remaining active frequently helps the healing process.     SEEK IMMEDIATE MEDICAL CARE IF YOU HAVE ANY OF THE FOLLOWING SYMPTOMS: bowel or bladder control problems, unusual weakness or numbness in your arms or legs, nausea or vomiting, abdominal pain, fever, dizziness/lightheadedness.      Sciatica    WHAT YOU NEED TO KNOW:    Sciatica is a condition that causes pain along your sciatic nerve. The sciatic nerve runs from your spine through both sides of your buttocks. It then runs down the back of your thigh, into your lower leg and foot. Your sciatic nerve may be compressed, inflamed, irritated, or stretched.          DISCHARGE INSTRUCTIONS:    Medicines:     NSAIDs: These medicines decrease swelling and pain. NSAIDs are available without a doctor's order. Ask your healthcare provider which medicine is right for you. Ask how much to take and when to take it. Take as directed. NSAIDs can cause stomach bleeding or kidney problems if not taken correctly.      Acetaminophen: This medicine decreases pain. Acetaminophen is available without a doctor's order. Ask how much to take and when to take it. Follow directions. Acetaminophen can cause liver damage if not taken correctly.      Muscle relaxers help decrease pain and muscle spasms.      Take your medicine as directed. Contact your healthcare provider if you think your medicine is not helping or if you have side effects. Tell him of her if you are allergic to any medicine. Keep a list of the medicines, vitamins, and herbs you take. Include the amounts, and when and why you take them. Bring the list or the pill bottles to follow-up visits. Carry your medicine list with you in case of an emergency.    Follow up with your healthcare provider as directed: Write down your questions so you remember to ask them during your visits.     Manage your symptoms:     Activity: Decrease your activity. Do not lift heavy objects or twist your back for at least 6 weeks. Slowly return to your usual activity.      Ice: Ice helps decrease swelling and pain. Ice may also help prevent tissue damage. Use an ice pack, or put crushed ice in a plastic bag. Cover it with a towel and place it on your low back or leg for 15 to 20 minutes every hour or as directed.      Heat: Heat helps decrease pain and muscle spasms. Apply heat on the area for 20 to 30 minutes every 2 hours for as many days as directed.       Physical therapy: You may need to see physical therapist to teach you exercises to help improve movement and strength, and to decrease pain. An occupational therapist teaches you skills to help with your daily activities.       Use assistive devices if directed: You may need to wear back support, such as a back brace. You may need crutches, a cane, or a walker to decrease stress on your lower back and leg muscles. Ask your healthcare provider for more information about assistive devices and how to use them correctly.    Self-care:     Avoid pressure on your back and legs: Do not lift heavy objects, or stand or sit for long periods of time.      Lift objects safely: Keep your back straight and bend your knees when you  an object. Do not bend or twist your back when you lift.      Maintain a healthy weight: Ask your healthcare provider how much you should weigh. Ask him to help you create a weight loss plan if you are overweight.       Exercise: Ask your healthcare provider about the best stretching, warmup, and exercise plan for you.     Contact your healthcare provider if:     You have pain in your lower back at night or when resting.      You have pain in your lower back with numbness below the knee.      You have weakness in one leg only.      You have questions or concerns about your condition or care.    Return to the emergency department if:     You have trouble holding back your urine or bowel movements.      You have weakness in both legs.      You have numbness in your groin or buttocks.         © Copyright [employer] 2019 All illustrations and images included in CareNotes are the copyrighted property of [employer] Inc[employer] or [employer].

## 2020-02-09 NOTE — ED PROVIDER NOTE - CLINICAL SUMMARY MEDICAL DECISION MAKING FREE TEXT BOX
Results reviewed and d/w patient, copies given.  Pt will need to follow up with PMD, Rehab.  will likely need MRI. Pt instructed to return if any worsening symptoms or concerns.  They verbalize understanding.

## 2020-02-09 NOTE — ED PROVIDER NOTE - NSFOLLOWUPCLINICS_GEN_ALL_ED_FT
Neurosurgery at [place]  Neurosurgery  [address] [place] [address], [address]  [place] [address]  Phone: [phone]  Fax:   Follow Up Time:

## 2020-02-09 NOTE — ED PROVIDER NOTE - ATTENDING CONTRIBUTION TO CARE
33 yo F presents with c/o pain to left buttock , lower back and lower abd x months.  Pt was seen in ED for same a few months ago and had CT and sono.  Fund with ovarian cyst and constipation.  no n/v, no urinary complaints, no numbness or tingling, no h/o trauma.  On exam pt in NAD AAO x 3, ambulatory in ED,  + tender left lower back , left piriformis, no rash, abd soft nt nd, Ext atrumatic, FROM good tone, equal strength

## 2020-02-09 NOTE — ED PROVIDER NOTE - NS ED ROS FT
Review of Systems    Constitutional: (-) fever or chills  respiratory: (-) cough (-) shortness of breath  Cardiovascular: (-) syncope, palpitations or chest pain  Integumentary: (-) rash or painful lymph nodes  Neurological: (-) altered mental status, headache or head injury  msk: left buttock pain

## 2020-02-10 LAB
CULTURE RESULTS: SIGNIFICANT CHANGE UP
SPECIMEN SOURCE: SIGNIFICANT CHANGE UP

## 2020-10-13 NOTE — ED ADULT NURSE NOTE - CARDIO ASSESSMENT
[FreeTextEntry1] : This is a 75-year-old male whose history has been reviewed above\par \par He has history of hypertension his blood pressure is at goal he has no orthostasis no medication changes\par \par He has a history of ASHD his cardiac exam is unchanged he has a few basal rales no dyspnea no edema probably secondary to his mask no intervention\par \par He has history of hypercholesterolemia and is on a statin a cholesterol profile has been obtained medication changes predicated on the results\par \par He has a history of gout he remains on allopurinol uric acid has been obtained he has had no attacks no medication changes\par \par He remains on an SSRI with only moderate success. This is monitored by his wife. We will continue current plan.\par \par In terms of his low back pain I had offered him spinal service however he has declined. He is using local medication at this time WDL

## 2020-10-30 ENCOUNTER — TRANSCRIPTION ENCOUNTER (OUTPATIENT)
Age: 35
End: 2020-10-30

## 2020-12-16 NOTE — ED ADULT NURSE NOTE - NSIMPLEMENTINTERV_GEN_ALL_ED
No Implemented All Universal Safety Interventions:  [place] to call system. Call bell, personal items and telephone within reach. Instruct patient to call for assistance. Room bathroom lighting operational. Non-slip footwear when patient is off stretcher. Physically safe environment: no spills, clutter or unnecessary equipment. Stretcher in lowest position, wheels locked, appropriate side rails in place.

## 2022-02-14 ENCOUNTER — EMERGENCY (EMERGENCY)
Facility: HOSPITAL | Age: 37
LOS: 0 days | Discharge: HOME | End: 2022-02-14
Attending: EMERGENCY MEDICINE | Admitting: EMERGENCY MEDICINE
Payer: MEDICAID

## 2022-02-14 VITALS
TEMPERATURE: 97 F | DIASTOLIC BLOOD PRESSURE: 64 MMHG | SYSTOLIC BLOOD PRESSURE: 105 MMHG | WEIGHT: 134.92 LBS | HEIGHT: 65 IN | OXYGEN SATURATION: 100 % | RESPIRATION RATE: 18 BRPM | HEART RATE: 70 BPM

## 2022-02-14 VITALS
HEART RATE: 65 BPM | TEMPERATURE: 97 F | DIASTOLIC BLOOD PRESSURE: 70 MMHG | RESPIRATION RATE: 16 BRPM | SYSTOLIC BLOOD PRESSURE: 111 MMHG | OXYGEN SATURATION: 100 %

## 2022-02-14 DIAGNOSIS — R11.0 NAUSEA: ICD-10-CM

## 2022-02-14 DIAGNOSIS — R10.32 LEFT LOWER QUADRANT PAIN: ICD-10-CM

## 2022-02-14 DIAGNOSIS — Z20.822 CONTACT WITH AND (SUSPECTED) EXPOSURE TO COVID-19: ICD-10-CM

## 2022-02-14 LAB
ALBUMIN SERPL ELPH-MCNC: 4.7 G/DL — SIGNIFICANT CHANGE UP (ref 3.5–5.2)
ALP SERPL-CCNC: 68 U/L — SIGNIFICANT CHANGE UP (ref 30–115)
ALT FLD-CCNC: 10 U/L — SIGNIFICANT CHANGE UP (ref 0–41)
ANION GAP SERPL CALC-SCNC: 9 MMOL/L — SIGNIFICANT CHANGE UP (ref 7–14)
APPEARANCE UR: CLEAR — SIGNIFICANT CHANGE UP
AST SERPL-CCNC: 15 U/L — SIGNIFICANT CHANGE UP (ref 0–41)
BASOPHILS # BLD AUTO: 0.04 K/UL — SIGNIFICANT CHANGE UP (ref 0–0.2)
BASOPHILS NFR BLD AUTO: 0.6 % — SIGNIFICANT CHANGE UP (ref 0–1)
BILIRUB SERPL-MCNC: 0.2 MG/DL — SIGNIFICANT CHANGE UP (ref 0.2–1.2)
BILIRUB UR-MCNC: NEGATIVE — SIGNIFICANT CHANGE UP
BUN SERPL-MCNC: 9 MG/DL — LOW (ref 10–20)
CALCIUM SERPL-MCNC: 9.6 MG/DL — SIGNIFICANT CHANGE UP (ref 8.5–10.1)
CHLORIDE SERPL-SCNC: 104 MMOL/L — SIGNIFICANT CHANGE UP (ref 98–110)
CO2 SERPL-SCNC: 25 MMOL/L — SIGNIFICANT CHANGE UP (ref 17–32)
COLOR SPEC: YELLOW — SIGNIFICANT CHANGE UP
CREAT SERPL-MCNC: 0.6 MG/DL — LOW (ref 0.7–1.5)
DIFF PNL FLD: NEGATIVE — SIGNIFICANT CHANGE UP
EOSINOPHIL # BLD AUTO: 0.08 K/UL — SIGNIFICANT CHANGE UP (ref 0–0.7)
EOSINOPHIL NFR BLD AUTO: 1.2 % — SIGNIFICANT CHANGE UP (ref 0–8)
GLUCOSE SERPL-MCNC: 82 MG/DL — SIGNIFICANT CHANGE UP (ref 70–99)
GLUCOSE UR QL: NEGATIVE MG/DL — SIGNIFICANT CHANGE UP
HCG UR QL: NEGATIVE — SIGNIFICANT CHANGE UP
HCT VFR BLD CALC: 39.6 % — SIGNIFICANT CHANGE UP (ref 37–47)
HGB BLD-MCNC: 13.3 G/DL — SIGNIFICANT CHANGE UP (ref 12–16)
IMM GRANULOCYTES NFR BLD AUTO: 0.3 % — SIGNIFICANT CHANGE UP (ref 0.1–0.3)
KETONES UR-MCNC: NEGATIVE — SIGNIFICANT CHANGE UP
LACTATE SERPL-SCNC: 1.6 MMOL/L — SIGNIFICANT CHANGE UP (ref 0.7–2)
LEUKOCYTE ESTERASE UR-ACNC: NEGATIVE — SIGNIFICANT CHANGE UP
LIDOCAIN IGE QN: 34 U/L — SIGNIFICANT CHANGE UP (ref 7–60)
LYMPHOCYTES # BLD AUTO: 2.26 K/UL — SIGNIFICANT CHANGE UP (ref 1.2–3.4)
LYMPHOCYTES # BLD AUTO: 34.1 % — SIGNIFICANT CHANGE UP (ref 20.5–51.1)
MCHC RBC-ENTMCNC: 29.9 PG — SIGNIFICANT CHANGE UP (ref 27–31)
MCHC RBC-ENTMCNC: 33.6 G/DL — SIGNIFICANT CHANGE UP (ref 32–37)
MCV RBC AUTO: 89 FL — SIGNIFICANT CHANGE UP (ref 81–99)
MONOCYTES # BLD AUTO: 0.44 K/UL — SIGNIFICANT CHANGE UP (ref 0.1–0.6)
MONOCYTES NFR BLD AUTO: 6.6 % — SIGNIFICANT CHANGE UP (ref 1.7–9.3)
NEUTROPHILS # BLD AUTO: 3.79 K/UL — SIGNIFICANT CHANGE UP (ref 1.4–6.5)
NEUTROPHILS NFR BLD AUTO: 57.2 % — SIGNIFICANT CHANGE UP (ref 42.2–75.2)
NITRITE UR-MCNC: NEGATIVE — SIGNIFICANT CHANGE UP
NRBC # BLD: 0 /100 WBCS — SIGNIFICANT CHANGE UP (ref 0–0)
PH UR: 5.5 — SIGNIFICANT CHANGE UP (ref 5–8)
PLATELET # BLD AUTO: 300 K/UL — SIGNIFICANT CHANGE UP (ref 130–400)
POTASSIUM SERPL-MCNC: 3.8 MMOL/L — SIGNIFICANT CHANGE UP (ref 3.5–5)
POTASSIUM SERPL-SCNC: 3.8 MMOL/L — SIGNIFICANT CHANGE UP (ref 3.5–5)
PROT SERPL-MCNC: 6.7 G/DL — SIGNIFICANT CHANGE UP (ref 6–8)
PROT UR-MCNC: NEGATIVE MG/DL — SIGNIFICANT CHANGE UP
RBC # BLD: 4.45 M/UL — SIGNIFICANT CHANGE UP (ref 4.2–5.4)
RBC # FLD: 12 % — SIGNIFICANT CHANGE UP (ref 11.5–14.5)
SARS-COV-2 RNA SPEC QL NAA+PROBE: SIGNIFICANT CHANGE UP
SODIUM SERPL-SCNC: 138 MMOL/L — SIGNIFICANT CHANGE UP (ref 135–146)
SP GR SPEC: 1.02 — SIGNIFICANT CHANGE UP (ref 1.01–1.03)
UROBILINOGEN FLD QL: 0.2 MG/DL — SIGNIFICANT CHANGE UP
WBC # BLD: 6.63 K/UL — SIGNIFICANT CHANGE UP (ref 4.8–10.8)
WBC # FLD AUTO: 6.63 K/UL — SIGNIFICANT CHANGE UP (ref 4.8–10.8)

## 2022-02-14 PROCEDURE — 76856 US EXAM PELVIC COMPLETE: CPT | Mod: 26

## 2022-02-14 PROCEDURE — 74176 CT ABD & PELVIS W/O CONTRAST: CPT | Mod: 26,MA

## 2022-02-14 PROCEDURE — 99285 EMERGENCY DEPT VISIT HI MDM: CPT

## 2022-02-14 NOTE — ED PROVIDER NOTE - OBJECTIVE STATEMENT
36 F no pmhx presents tot the ED for nausea and LLQ abdominal pain for th elast 2 days pt states that the pain is a non radiating sharp pain made worse by pressing down in the area. pt denies any vomiting and has been able to eat and drink since the nausea and pain started. no recent sick contacts or precipitating events.

## 2022-02-14 NOTE — ED PROVIDER NOTE - CLINICAL SUMMARY MEDICAL DECISION MAKING FREE TEXT BOX
36-year-old female no past medical history presents with left lower quadrant pain x2 days sharp nonradiating no urinary changes associated with nausea last menstrual.  1 week ago no vaginal discharge or bleeding.  On exam patient alert well-appearing no jaundice CVS RRR respiratory CTA no tachypnea abdomen soft mild LLQ tenderness no rebound no guarding no rigidity labs reviewed within normal limitsPelvic US : Trace fluid within the cervical canal, nonspecific. ,Rightovarian 1.0 cm follicle. Left ovarian 2.2 cm cyst/follicle.  CT AP  No CT evidence of acute intra-abdominal pathology. Normal caliber   appendix.  Patient to be discharged from ED in well appearing condition. Any available test results were discussed with and printed  for patient.  Verbal instructions given, including instructions to return to ED immediately for any new, worsening, or concerning symptoms. Limitations of ED work up discussed.  Patient reports understanding of above with capacity and insight. Written discharge instructions additionally given, including follow-up plan.

## 2022-02-14 NOTE — ED PROVIDER NOTE - NSFOLLOWUPINSTRUCTIONS_ED_ALL_ED_FT
Follow up with your gyn   this week and your PMD 1-3 days     RETURN TO ED  FOR ANY NEW WORSENING OR CONCERNING SYMPTOMS TO YOU, ALSO AS WE DISCUSSED. WE ARE HERE AND HAPPY TO TAKE CARE OF YOU    OVARIAN CYST - Discharge Care     Ovarian Cyst    WHAT YOU NEED TO KNOW:    An ovarian cyst is a sac that grows on an ovary. This sac usually contains fluid, but may sometimes have blood or tissue in it. Most ovarian cysts are harmless and go away without treatment in a few months. Some cysts can grow large, cause pain, or break open.     DISCHARGE INSTRUCTIONS:    Call 911 for any of the following:     You are too weak or dizzy to stand up.        Seek care immediately if:     You have severe abdominal pain. The pain may be sharp and sudden.      You have a fever.    Contact your healthcare provider if:     Your periods are early, late, or more painful than usual.      You have bleeding from your vagina that is not your period.      You have abdominal pain all the time.      Your abdomen is swollen.      You have feelings of fullness, pressure, or discomfort in your abdomen.      You have trouble urinating or emptying your bladder completely.      You have pain during sex.      You are losing weight without trying.      You have questions or concerns about your condition or care.    Medicines: You may need any of the following:     NSAIDs, such as ibuprofen, help decrease swelling, pain, and fever. This medicine is available with or without a doctor's order. NSAIDs can cause stomach bleeding or kidney problems in certain people. If you take blood thinner medicine, always ask if NSAIDs are safe for you. Always read the medicine label and follow directions. Do not give these medicines to children under 6 months of age without direction from your child's healthcare provider.      Birth control pills may help to control your periods, prevent cysts, or cause them to shrink.      Take your medicine as directed. Contact your healthcare provider if you think your medicine is not helping or if you have side effects. Tell him or her if you are allergic to any medicine. Keep a list of the medicines, vitamins, and herbs you take. Include the amounts, and when and why you take them. Bring the list or the pill bottles to follow-up visits. Carry your medicine list with you in case of an emergency.    Follow up with your healthcare provider as directed: Write down your questions so you remember to ask them during your visits.     Apply heat to decrease pain and cramping: Sit in a warm bath, or place a heating pad (turned on low) or a hot water bottle on your abdomen. Do this for 15 to 20 minutes every hour for as many days as directed

## 2022-02-14 NOTE — ED PROVIDER NOTE - NS ED ROS FT
Review of Systems  Constitutional:  No fever, chills, malaise, generalized weakness.  Eyes:  No visual changes, eye pain, or discharge.  ENMT:  No hearing changes, pain, or discharge. No nasal congestion, discharge, or bleeding. No throat pain, swelling, or difficulty swallowing.  Cardiac:  No chest pain,   Respiratory:  No dyspnea,  GI:  + nausea, vomiting, - diarrhea, + abdominal pain. No melena or hematochezia.  :  No dysuria, hematuria, frequency, or burning. Normal urine output. No vaginal bleeding/discharge. LMP 3 days ago  MS:  No myalgia, muscle weakness, gait abnormality, joint swelling, joint pain, or back pain.  Skin:  No skin rash, pruritis, jaundice, or lesions.  Neuro:  No headache, dizziness,

## 2022-02-14 NOTE — ED PROVIDER NOTE - PROGRESS NOTE DETAILS
Spoke with CT pt is refusing CT contrast, because she does not want it in her body. plan to do dry scan. PA fellow note reviewed and agree,

## 2022-02-14 NOTE — ED PROVIDER NOTE - PATIENT PORTAL LINK FT
You can access the FollowMyHealth Patient Portal offered by [facility] by registering at the following website: [email or web address]. By joining [employer]’s FollowMyHealth portal, you will also be able to view your health information using other applications (apps) compatible with our system.

## 2022-02-14 NOTE — ED PROVIDER NOTE - PHYSICAL EXAMINATION
VITAL SIGNS: I have reviewed nursing notes and confirm.  CONSTITUTIONAL: Well-developed; well-nourished; in no acute distress.  SKIN: Skin exam is warm and dry, no acute rash.  EYES: PERRL, EOM intact; conjunctiva and sclera clear.  ENT: No nasal discharge; airway clear.   CARD: S1, S2 normal; no murmurs, gallops, or rubs. Regular rate and rhythm.  RESP: No wheezes, rales or rhonchi. Speaking in full sentences.   ABD: Normal bowel sounds; soft; non-distended; tender LLQ; No rebound or guarding. No CVA tenderness. negative psoas, obturator signs  EXT: Normal ROM. No clubbing, cyanosis or edema.  NEURO: Alert, oriented. Grossly unremarkable. No focal deficits.   PSYCH: Cooperative, appropriate.

## 2022-07-26 ENCOUNTER — EMERGENCY (EMERGENCY)
Facility: HOSPITAL | Age: 37
LOS: 0 days | Discharge: HOME | End: 2022-07-26
Attending: EMERGENCY MEDICINE | Admitting: EMERGENCY MEDICINE

## 2022-07-26 VITALS
HEIGHT: 65 IN | WEIGHT: 136.91 LBS | RESPIRATION RATE: 20 BRPM | OXYGEN SATURATION: 100 % | DIASTOLIC BLOOD PRESSURE: 73 MMHG | TEMPERATURE: 96 F | HEART RATE: 85 BPM | SYSTOLIC BLOOD PRESSURE: 118 MMHG

## 2022-07-26 DIAGNOSIS — R06.02 SHORTNESS OF BREATH: ICD-10-CM

## 2022-07-26 PROCEDURE — 93010 ELECTROCARDIOGRAM REPORT: CPT

## 2022-07-26 PROCEDURE — 71045 X-RAY EXAM CHEST 1 VIEW: CPT | Mod: 26

## 2022-07-26 PROCEDURE — 99284 EMERGENCY DEPT VISIT MOD MDM: CPT

## 2022-07-26 NOTE — ED PROVIDER NOTE - NS ED ROS FT
Constitutional: (-) fever (-) malaise (-) diaphoresis (-) chills  Eyes: (-) visual changes (-) eye pain  Cardiac: (-) chest pain  (-) palpitations (-) syncope (-) edema  Respiratory: (-) cough (+) SOB (-) HANNA  GI: (-) nausea (-) vomiting (-) diarrhea (-) abdominal pain   : (-) dysuria  MS: (-) back pain (-) myalgia (-) muscle weakness (-)  joint pain  Neuro: (-) headache (-) dizziness (-) numbness/tingling to extremities B/L (-) weakness   Skin: (-) rash (-) laceration    Except as documented in the HPI, all other systems are negative.

## 2022-07-26 NOTE — ED PROVIDER NOTE - OBJECTIVE STATEMENT
37-year-old female no past medical history, no history of presenting to the ED for evaluation of sudden onset of shortness of breath and feeling "hot flash" while driving about 20 minutes ago.  Patient reports she feels like it is difficult for her to catch her breath.  Patient states since coming to the ED is feeling a little better.  Then he was here for CPE.  Denies any other, chills, cough, chest pain, lower extremity swelling, calf pain, syncope, dizziness.

## 2022-07-26 NOTE — ED PROVIDER NOTE - PATIENT PORTAL LINK FT
You can access the FollowMyHealth Patient Portal offered by [facility] by registering at the following website: [email or web address]. By joining [employer]’s FollowMyHealth portal, you will also be able to view your health information using other applications (apps) compatible with our system.

## 2022-07-26 NOTE — ED PROVIDER NOTE - PHYSICAL EXAMINATION
GENERAL: Well-nourished, Well-developed. NAD.  HEAD: No visible or palpable bumps or hematomas. No ecchymosis behind ears B/L.  Eyes: PERRLA, EOMI.  Neck: Supple. FROM  CVS: RRR. Normal S1,S2. No murmurs appreciated on auscultation   RESP: No use of accessory muscles. Chest rise symmetrical with good expansion. Lungs clear to auscultation B/L. No wheezing, rales, or rhonchi auscultated.  GI: Normal auscultation of bowel sounds in all 4 quadrants. Soft, Nontender, Nondistended. No guarding or rebound tenderness. No CVAT B/L.  Skin: Warm, Dry. No rashes or lesions. Good cap refill < 2 sec B/L.  EXT: Radial and pedal pulses present B/L. No calf tenderness or swelling B/L. No palpable cords. No pedal edema B/L.

## 2022-07-26 NOTE — ED PROVIDER NOTE - CLINICAL SUMMARY MEDICAL DECISION MAKING FREE TEXT BOX
37yF  pw  SOB  while driving  short distance,  no recent immobilization no hypercoagulability ,p  no fever  cough, no leg pain swelling no chest pain.  pt well appearing  o2 100% not tachy,  no fever ,  resp cta no tachypnea or distress, cvs rrr no le edema.    CXR no ptx ,  ekg nsr . pt refused labs - discussed  troponin and dimer , as patient in low risk and  PERC negative - pt dcd well appearing condition to follow up with her doctor as sh said she will   Patient to be discharged from ED in well appearing condition. Any available test results were discussed with and printed  for patient.  Verbal instructions given, including instructions to return to ED immediately for any new, worsening, or concerning symptoms. Limitations of ED work up discussed.  Patient reports understanding of above with capacity and insight. Written discharge instructions additionally given, including follow-up plan.

## 2022-07-26 NOTE — ED PROVIDER NOTE - NSFOLLOWUPINSTRUCTIONS_ED_ALL_ED_FT
FOllow with your doctor tomorrow    RETURN TO ED  FOR ANY NEW WORSENING OR CONCERNING SYMPTOMS TO YOU, ALSO AS WE DISCUSSED. WE ARE HERE AND HAPPY TO TAKE CARE OF YOU      Shortness of Breath, Adult  ImageShortness of breath is when a person has trouble breathing enough air, or when a person feels like she or he is having trouble breathing in enough air. Shortness of breath could be a sign of medical problem.    Follow these instructions at home:  Pay attention to any changes in your symptoms. Take these actions to help with your condition:    Do not smoke. Smoking is a common cause of shortness of breath. If you smoke and you need help quitting, ask your health care provider.  Avoid things that can irritate your airways, such as:    Mold.  Dust.  Air pollution.  Chemical fumes.  Things that can cause allergy symptoms (allergens), if you have allergies.    Keep your living space clean and free of mold and dust.  Rest as needed. Slowly return to your usual activities.  Take over-the-counter and prescription medicines, including oxygen and inhaled medicines, only as told by your health care provider.  Keep all follow-up visits as told by your health care provider. This is important.    Contact a health care provider if:  Your condition does not improve as soon as expected.  You have a hard time doing your normal activities, even after you rest.  You have new symptoms.  Get help right away if:  Your shortness of breath gets worse.  You have shortness of breath when you are resting.  You feel light-headed or you faint.  You have a cough that is not controlled with medicines.  You cough up blood.  You have pain with breathing.  You have pain in your chest, arms, shoulders, or abdomen.  You have a fever.  You cannot walk up stairs or exercise the way that you normally do.  This information is not intended to replace advice given to you by your health care provider. Make sure you discuss any questions you have with your health care provider

## 2022-07-26 NOTE — ED PROVIDER NOTE - PROGRESS NOTE DETAILS
Pt is reusing  blood tests -  sais she will follow up with her PMD   -  urged to return to ED at any time for reassessment Pt is PERC negative CXR no ptx  not tachy not hypoxic . Will dx in stable well appearing condition

## 2022-10-10 NOTE — ED ADULT NURSE NOTE - NSSUHOSCREENINGYN_ED_ALL_ED
[facility]  Pacemaker Implantation Procedure Note    [place] Patient Status:  Outpatient in a Bed    1949 MRN [id number]   Location [address] [facility] Attending Jeanna Stevens MD   Hosp Day # 0 PCP Ezekiel Chisholm DO     OPERATION(S) PERFORMED:   1. Left upper extremity venography. : Zina Schofield MD  INDICATION: Pre- procedure planning for CRT-D upgrade  COMPLICATIONS: None      ESTIMATED BLOOD LOSS:none         METHODS: The patient was brought to the EP lab in a fasting and nonsedated state after providing informed consent. IV contrast was given from a left arm peripheral IV. There was good glow around the pacing leads suggesting patency of the axillary vein. No evidence of significant collaterals. CONCLUSIONS:   1.  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
Yes - the patient is able to be screened

## 2023-08-12 ENCOUNTER — NON-APPOINTMENT (OUTPATIENT)
Age: 38
End: 2023-08-12

## 2023-11-08 NOTE — ED ADULT NURSE NOTE - PRO INTERPRETER NEED 2
Attempting to contact pt to re-schedule primary care appt due to provider leaving.     Unable to reach patient at this time. Left voicemail.   English

## 2024-07-06 ENCOUNTER — NON-APPOINTMENT (OUTPATIENT)
Age: 39
End: 2024-07-06

## 2024-10-20 ENCOUNTER — NON-APPOINTMENT (OUTPATIENT)
Age: 39
End: 2024-10-20

## 2024-10-22 ENCOUNTER — EMERGENCY (EMERGENCY)
Facility: HOSPITAL | Age: 39
LOS: 0 days | Discharge: HOME | End: 2024-10-22
Attending: EMERGENCY MEDICINE
Payer: MEDICAID

## 2024-10-22 VITALS
SYSTOLIC BLOOD PRESSURE: 109 MMHG | OXYGEN SATURATION: 100 % | WEIGHT: 139.99 LBS | RESPIRATION RATE: 17 BRPM | TEMPERATURE: 98 F | HEART RATE: 75 BPM | DIASTOLIC BLOOD PRESSURE: 87 MMHG

## 2024-10-22 DIAGNOSIS — H00.013 HORDEOLUM EXTERNUM RIGHT EYE, UNSPECIFIED EYELID: ICD-10-CM

## 2024-10-22 PROCEDURE — 99283 EMERGENCY DEPT VISIT LOW MDM: CPT

## 2024-10-22 PROCEDURE — 99284 EMERGENCY DEPT VISIT MOD MDM: CPT

## 2024-10-22 RX ORDER — METHYLPREDNISOLONE ACETATE 80 MG/ML
4 INJECTION, SUSPENSION INTRA-ARTICULAR; INTRALESIONAL; INTRAMUSCULAR; SOFT TISSUE
Qty: 21 | Refills: 0
Start: 2024-10-22 | End: 2024-10-27

## 2024-10-22 RX ORDER — FLUORESCEIN SODIUM 100 %
1 POWDER (GRAM) MISCELLANEOUS ONCE
Refills: 0 | Status: DISCONTINUED | OUTPATIENT
Start: 2024-10-22 | End: 2024-10-22

## 2024-10-22 RX ORDER — TETRACAINE HCL 0.5 %
1 DROPS OPHTHALMIC (EYE) ONCE
Refills: 0 | Status: DISCONTINUED | OUTPATIENT
Start: 2024-10-22 | End: 2024-10-22

## 2024-10-22 NOTE — ED PROVIDER NOTE - PHYSICAL EXAMINATION
VITAL SIGNS: I have reviewed nursing notes and confirm.  CONSTITUTIONAL: well-appearing, non-toxic, NAD  SKIN: Warm dry, normal skin turgor. Mild redness to right lower eyelid.  HEAD: NCAT  EYES: EOMI, PERRLA, no scleral icterus. + Stye to right inner eyelid. Normal eye stain and visual fields, vision.  ENT: Moist mucous membranes, normal pharynx with no erythema or exudates  NECK: Supple; non tender. Full ROM. No cervical LAD  CARD: RRR, no murmurs, rubs or gallops  RESP: clear to ausculation b/l.  No rales, rhonchi, or wheezing.  NEURO: normal motor. normal sensory. CN II-XII intact.   PSYCH: Cooperative, appropriate.

## 2024-10-22 NOTE — ED ADULT NURSE NOTE - NSFALLUNIVINTERV_ED_ALL_ED
Bed/Stretcher in lowest position, wheels locked, appropriate side rails in place/Call bell, personal items and telephone in reach/Instruct patient to call for assistance before getting out of bed/chair/stretcher/Non-slip footwear applied when patient is off stretcher/[place] to call system/Physically safe environment - no spills, clutter or unnecessary equipment/Purposeful proactive rounding/Room/bathroom lighting operational, light cord in reach

## 2024-10-22 NOTE — ED PROVIDER NOTE - DIFFERENTIAL DIAGNOSIS
The differential diagnosis for patients clinical presentation includes but is not limited to:  stye, chalazion, preseptal cellulitis, orbital cellulitis Differential Diagnosis

## 2024-10-22 NOTE — ED PROVIDER NOTE - TEST CONSIDERED BUT NOT PERFORMED
CT orbits - This test was considered in the ED for this patient, however, it is below testing threshold when considering clinical picture, risk, and availability. Tests Considered But Not Performed

## 2024-10-22 NOTE — ED PROVIDER NOTE - NSFOLLOWUPCLINICS_GEN_ALL_ED_FT
[facility]  Ophthalmolgy  [address], [address]  [place] [address]  Phone: [phone]  Fax:   Follow Up Time: 1-3 Days

## 2024-10-22 NOTE — ED PROVIDER NOTE - PATIENT PORTAL LINK FT
You can access the FollowMyHealth Patient Portal offered by [facility] by registering at the following website: [email or web address]. By joining [employer]’s FollowMyHealth portal, you will also be able to view your health information using other applications (apps) compatible with our system.

## 2024-10-22 NOTE — ED PROVIDER NOTE - CLINICAL SUMMARY MEDICAL DECISION MAKING FREE TEXT BOX
Patient seen and evaluated and is on appropriate treatment for stye.  Eye stain negative and patient has no signs of orbital cellulitis.  Patient is on oral antibiotics and clinical picture not consistent with preseptal cellulitis at this time.  Patient using warm compresses.  Patient spoken to about close follow-up with ophthalmology and is aware she may require further treatment.  Appropriate home care discussed.  Signs of worsening infection discussed and patient knows when to seek immediate medical attention.    Full DC instructions discussed and patient knows when to seek immediate medical attention.  Patient has proper follow up.  All results discussed and patient aware they may require further work up.  Proper follow up ensured. Limitations of ED work up discussed.  Medications administered and prescribed/OTC home meds discussed.  All questions and concerns from patient or family addressed. Understanding of instructions verbalized.

## 2024-10-22 NOTE — ED PROVIDER NOTE - NSFOLLOWUPINSTRUCTIONS_ED_ALL_ED_FT
Stye    WHAT YOU NEED TO KNOW:    A stye is a lump on the edge or inside of your eyelid caused by an infection. A stye can form on your upper or lower eyelid. It usually goes away in 2 to 4 days.    DISCHARGE INSTRUCTIONS:    Medicines:   •Antibiotic medicine: This is given as an ointment to put into your eye. It is used to fight an infection caused by bacteria. Use as directed.     •Take your medicine as directed. Contact your healthcare provider if you think your medicine is not helping or if you have side effects. Tell him of her if you are allergic to any medicine. Keep a list of the medicines, vitamins, and herbs you take. Include the amounts, and when and why you take them. Bring the list or the pill bottles to follow-up visits. Carry your medicine list with you in case of an emergency.    Follow up with your doctor as directed: Write down your questions so you remember to ask them during your visits.     Self-care:   •Use warm compresses: This will help decrease swelling and pain. Wet a clean washcloth with warm water and place it on your eye for 10 to 15 minutes, 3 to 4 times each day or as directed.    •Keep your hands away from your eye: This helps to prevent the spread of the infection to other parts of the eye. Wash your hands often with soap and dry with a clean towel. Do not squeeze the stye.     •Do not use eye makeup: Do not wear eye makeup while you have a stye. Eye makeup may carry bacteria and cause another stye. Throw away eye makeup and brushes used to apply the makeup. Use new eye makeup after the stye has gone away. Do not share eye makeup with others  .  •Prevent another stye: Wash your face and clean your eyelashes every day. Remove eye makeup with makeup remover. This helps to completely remove eye makeup without heavy rubbing.     Contact your healthcare provider if:   •You have redness and discharge around your eye, and your eye pain is getting worse.  •Your vision changes.  •The stye has not gone away within 7 days.   •The stye comes back within a short period of time after treatment.  •You have questions or concerns about your condition or care.

## 2024-10-22 NOTE — ED PROVIDER NOTE - OBJECTIVE STATEMENT
39-year-old female with no significant past medical history presents to the emergency department for right eye lid redness and swelling for the past several days.  Patient went to urgent care 2 days ago for similar symptoms and was diagnosed with a stye and was started on oral antibiotics and antibiotic ointment.  Patient was using this as prescribed but reports continued redness and swelling.  Patient denies any fever, chills, nasal congestion, pus drainage.  Patient has no visual symptoms or eye pain.

## 2024-10-22 NOTE — ED ADULT TRIAGE NOTE - CHIEF COMPLAINT QUOTE
rt eye irritation, prescribed keflex and erythromycin ointment by [facility] on thurday.

## 2024-10-22 NOTE — ED ADULT NURSE NOTE - CHIEF COMPLAINT QUOTE
rt eye irritation, prescribed keflex and erythromycin ointment by [facility] on thurday.

## 2024-11-02 ENCOUNTER — EMERGENCY (EMERGENCY)
Facility: HOSPITAL | Age: 39
LOS: 0 days | Discharge: HOME | End: 2024-11-02
Attending: EMERGENCY MEDICINE
Payer: MEDICAID

## 2024-11-02 VITALS
DIASTOLIC BLOOD PRESSURE: 82 MMHG | HEIGHT: 65 IN | OXYGEN SATURATION: 99 % | SYSTOLIC BLOOD PRESSURE: 125 MMHG | TEMPERATURE: 99 F | RESPIRATION RATE: 18 BRPM | HEART RATE: 71 BPM | WEIGHT: 139.99 LBS

## 2024-11-02 DIAGNOSIS — H00.015 HORDEOLUM EXTERNUM LEFT LOWER EYELID: ICD-10-CM

## 2024-11-02 DIAGNOSIS — H00.012 HORDEOLUM EXTERNUM RIGHT LOWER EYELID: ICD-10-CM

## 2024-11-02 DIAGNOSIS — H02.843 EDEMA OF RIGHT EYE, UNSPECIFIED EYELID: ICD-10-CM

## 2024-11-02 PROCEDURE — 99283 EMERGENCY DEPT VISIT LOW MDM: CPT

## 2024-11-02 PROCEDURE — 99284 EMERGENCY DEPT VISIT MOD MDM: CPT

## 2024-11-02 RX ORDER — METHYLPREDNISOLONE ACETATE 80 MG/ML
1 INJECTION, SUSPENSION INTRA-ARTICULAR; INTRALESIONAL; INTRAMUSCULAR; SOFT TISSUE
Qty: 1 | Refills: 0
Start: 2024-11-02 | End: 2024-11-07

## 2024-11-29 ENCOUNTER — NON-APPOINTMENT (OUTPATIENT)
Age: 39
End: 2024-11-29

## 2025-01-07 NOTE — ED PROVIDER NOTE - NS ED MD EM SELECTION
Called out to patient and left message on her voice mail of the no samples and did relay to her to call us back if she would like a prior auth done and script if approved so we can get this to her as an order.    
Last OV 9/30/2024  Next OV none - Return in about 6 months (around 3/30/2025), or if symptoms worsen or fail to improve.     Returned patient's call. Patient reporting a \"severe outbreak\" of her pyoderma.    States it started on arms and hands, now moving down towards legs.     Patient states that previously she had been provided with a sample of Otezla and \"that cleared it right up.\"    Patient was offered appointment at 9:30 am but declined, also states that she is unable to send photos at this time, portal message sent in case patient is able to log on to [employer].           
Patient called and said she is having a outbreak. She would like a nurse to give her a call. I offered her a cancellation today but she could not make it.   
Was there a reason Otezla was discontinued? I can place a new PA/script for the Otezla, but I do not have any samples either.   
[id number] Detailed

## 2025-04-10 ENCOUNTER — EMERGENCY (EMERGENCY)
Facility: HOSPITAL | Age: 40
LOS: 0 days | Discharge: HOME | End: 2025-04-10
Attending: EMERGENCY MEDICINE
Payer: MEDICAID

## 2025-04-10 VITALS
WEIGHT: 139.99 LBS | SYSTOLIC BLOOD PRESSURE: 126 MMHG | DIASTOLIC BLOOD PRESSURE: 81 MMHG | TEMPERATURE: 98 F | RESPIRATION RATE: 18 BRPM | OXYGEN SATURATION: 100 % | HEIGHT: 65 IN | HEART RATE: 79 BPM

## 2025-04-10 DIAGNOSIS — H57.89 OTHER SPECIFIED DISORDERS OF EYE AND ADNEXA: ICD-10-CM

## 2025-04-10 DIAGNOSIS — H01.004 UNSPECIFIED BLEPHARITIS LEFT UPPER EYELID: ICD-10-CM

## 2025-04-10 DIAGNOSIS — N39.0 URINARY TRACT INFECTION, SITE NOT SPECIFIED: ICD-10-CM

## 2025-04-10 DIAGNOSIS — H02.844 EDEMA OF LEFT UPPER EYELID: ICD-10-CM

## 2025-04-10 PROCEDURE — 99283 EMERGENCY DEPT VISIT LOW MDM: CPT

## 2025-04-10 RX ORDER — ERYTHROMYCIN 5 MG/G
1 OINTMENT OPHTHALMIC ONCE
Refills: 0 | Status: COMPLETED | OUTPATIENT
Start: 2025-04-10 | End: 2025-04-10

## 2025-04-10 RX ADMIN — ERYTHROMYCIN 1 APPLICATION(S): 5 OINTMENT OPHTHALMIC at 04:43

## 2025-04-10 NOTE — ED PROVIDER NOTE - PHYSICAL EXAMINATION
Vital Signs: I have reviewed the initial vital signs.  Constitutional: appears stated age, no acute distress  EYES: + mild left upper eyelid swelling and erythema. No periorbital ecchymosis; soft orbits. Conjunctiva pink. PERRL, EOMI. No Sclera clear. No subconjunctival hemorrhage.  No eye chemosis, hydroleum, chalazion, no purulent discharge.   Cardiovascular: S1 and S2, regular rate, regular rhythm, well-perfused extremities  Respiratory: unlabored respiratory effort  Integumentary: warm, dry, no rash  Neurologic: awake, alert, oriented x3

## 2025-04-10 NOTE — ED PROVIDER NOTE - CLINICAL SUMMARY MEDICAL DECISION MAKING FREE TEXT BOX
38 yo healthy female here for assessment of L upper eye lid swelling. Had mild, self limited sx 2 days ago, woke with recurrence. No new products, chemical exposure, is on macrobid for UTI but has no previous reaction to it. No vision change, discharge, lash matting, fever, chills.    VS normal, has soft swelling to L upper lid distally around lash line, non injected conjunctive, no chemosis, redness, discharge, lid matting, PERRL, EOMI and painless.    Sx suggestive of blepharitis, no signs of cellulitis, conjunctivitis, abscess.    Advised on warm compress, use of erythromycin ophthalmic, ophtho follow up, return precautions.

## 2025-04-10 NOTE — ED PROVIDER NOTE - OBJECTIVE STATEMENT
39-year-old female presents with complaints of left upper eyelid erythema.  Reports 2 days ago she noted mild tenderness and swelling to left upper eyelid.  Reports this evening she woke up with pain to the same distribution, prompting emergency department evaluation.  States she is on day 6 out of 10 of Macrobid for UTI.  Denies other complaints, vision change, headache, fever.

## 2025-04-10 NOTE — ED PROVIDER NOTE - NSFOLLOWUPINSTRUCTIONS_ED_ALL_ED_FT
Use erythromycin ointment by placing a 1 inch strip in the left eye 4 times a day for 5 days.    Our Emergency Department Referral Coordinators will be reaching out to you in the next 24-48 hours from 9:00am to 5:00pm to schedule a follow up appointment. Please expect a phone call from the hospital in that time frame. If you do not receive a call or if you have any questions or concerns, you can reach them at   [phone].    Blepharitis  A normal eye compared to an eye with an inflamed eyelid, or blepharitis.  Blepharitis refers to inflammation of the eyelids. It is a common condition and can cause dryness or grittiness in the eyes. Other symptoms may include:  Reddish, scaly skin around the scalp and eyebrows.  Burning or itching of the eyelids.  Eye discharge at night that causes the eyelashes to stick together in the morning.  Eyelashes that fall out.  Redness of the eyes.  Sensitivity to light.  Follow these instructions at home:  Pay attention to any changes in how your eyes look or feel. Tell your health care provider about any changes. Follow these instructions to help with your condition.    Keeping clean    Wash your hands often with soap and water for at least 20 seconds.  Clean your eyes and wash the edges of your eyelids with diluted baby shampoo or commercial eyelid wipes. Do this 2 or more times a day.  Wash your face and eyebrows at least once a day.  Use a clean towel each time you dry your eyelids. Do not use this towel to clean or dry other areas of your body. Do not share your towel with anyone.  General instructions    Avoid wearing makeup until you get better. Do not share makeup with anyone.  Avoid rubbing your eyes.  Use warm compresses on the eyes for 5–10 minutes. Do this 1 or 2 times a day, or as told by your health care provider. You can use warm water on a towel, but a microwaveable heating pad often stays warm longer. The pad should be very warm but not hot enough to burn the skin.  If you were prescribed an antibiotic ointment or steroid drops, apply or use the medicine as told by your health care provider. Do not stop using the medicine even if you feel better.  Keep all follow-up visits. This is important.  Contact a health care provider if:  Your eyelids feel hot.  You have blisters or a rash on your eyelids.  The inflammation gets worse or does not go away in 2–4 days.  Get help right away if:  You have pain or redness that gets worse or spreads to other parts of your face.  Your vision changes.  You have pain when looking at lights or moving objects.  You have a fever.  Summary  Blepharitis refers to inflammation of the eyelids. It can cause dryness and grittiness in the eyes.  Pay attention to any changes in how your eyes look or feel. Tell your health care provider about any changes.  Follow home care instructions as told by your health care provider. Wash your hands often with soap and water for at least 20 seconds. Avoid wearing makeup. Do not rub your eyes.  If you were prescribed an antibiotic ointment or steroid drops, apply or use the medicine as told by your health care provider.  Get help right away if you have a fever, vision changes, pain or redness that gets worse or spreads to other parts of your face, or pain when looking at lights or moving objects.

## 2025-04-10 NOTE — CHART NOTE - NSCHARTNOTEFT_GEN_A_CORE
switched to PCP as per instr from Dr. Moffett- AC / pt will self schedule appt 4/10 - DK (PCP Referral)

## 2025-04-10 NOTE — ED PROVIDER NOTE - PATIENT PORTAL LINK FT
You can access the FollowMyHealth Patient Portal offered by [facility] by registering at the following website: [email or web address]. By joining [employer]’s FollowMyHealth portal, you will also be able to view your health information using other applications (apps) compatible with our system.

## 2025-04-10 NOTE — ED PROVIDER NOTE - NSFOLLOWUPCLINICS_GEN_ALL_ED_FT
[facility]  Ophthalmolgy  [address], [address]  [place] [address]  Phone: [phone]  Fax:   Follow Up Time: 1-3 Days

## 2025-04-15 ENCOUNTER — APPOINTMENT (OUTPATIENT)
Dept: PAIN MANAGEMENT | Facility: CLINIC | Age: 40
End: 2025-04-15

## 2025-04-27 ENCOUNTER — NON-APPOINTMENT (OUTPATIENT)
Age: 40
End: 2025-04-27

## 2025-05-27 ENCOUNTER — EMERGENCY (EMERGENCY)
Facility: HOSPITAL | Age: 40
LOS: 0 days | Discharge: HOME | End: 2025-05-27
Attending: EMERGENCY MEDICINE
Payer: MEDICAID

## 2025-05-27 VITALS
OXYGEN SATURATION: 99 % | SYSTOLIC BLOOD PRESSURE: 111 MMHG | HEIGHT: 65 IN | HEART RATE: 61 BPM | RESPIRATION RATE: 18 BRPM | TEMPERATURE: 98 F | WEIGHT: 134.92 LBS | DIASTOLIC BLOOD PRESSURE: 78 MMHG
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
## 2025-05-27 DIAGNOSIS — S05.02XA INJURY OF CONJUNCTIVA AND CORNEAL ABRASION WITHOUT FOREIGN BODY, LEFT EYE, INITIAL ENCOUNTER: ICD-10-CM
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
## 2025-05-27 DIAGNOSIS — H57.12 OCULAR PAIN, LEFT EYE: ICD-10-CM

## 2025-05-27 PROCEDURE — 99284 EMERGENCY DEPT VISIT MOD MDM: CPT

## 2025-05-27 PROCEDURE — 99283 EMERGENCY DEPT VISIT LOW MDM: CPT

## 2025-05-27 RX ORDER — FLUORESCEIN SODIUM 0.6 MG
1 STRIP OPHTHALMIC (EYE) ONCE
Refills: 0 | Status: COMPLETED | OUTPATIENT
Start: 2025-05-27 | End: 2025-05-27

## 2025-05-27 RX ORDER — TETRACAINE HYDROCHLORIDE 5 MG/ML
1 SOLUTION OPHTHALMIC ONCE
Refills: 0 | Status: COMPLETED | OUTPATIENT
Start: 2025-05-27 | End: 2025-05-27

## 2025-05-27 RX ADMIN — Medication 1 APPLICATION(S): at 18:46

## 2025-05-27 RX ADMIN — TETRACAINE HYDROCHLORIDE 1 DROP(S): 5 SOLUTION OPHTHALMIC at 18:47

## 2025-05-27 NOTE — ED PROVIDER NOTE - PATIENT PORTAL LINK FT
You can access the FollowMyHealth Patient Portal offered by [facility] by registering at the following website: [email or web address]. By joining [employer]’s FollowMyHealth portal, you will also be able to view your health information using other applications (apps) compatible with our system.

## 2025-05-27 NOTE — ED PROVIDER NOTE - PHYSICAL EXAMINATION
VITAL SIGNS: I have reviewed nursing notes and confirm.  CONSTITUTIONAL: well-appearing, non-toxic, NAD  SKIN: Warm dry, normal skin turgor  HEAD: NCAT  EYES: EOMI, PERRLA, no scleral icterus (+) Injection of the left eye (+) Fluorescein stain: Corneal abrasion at the 6 o'clock position of the left eye  ENT: Moist mucous membranes, normal pharynx with no erythema or exudates  NECK: Supple; non tender. Full ROM. No cervical LAD  CARD: RRR, no murmurs, rubs or gallops  RESP: clear to ausculation b/l.  No rales, rhonchi, or wheezing.  PSYCH: Cooperative, appropriate.

## 2025-05-27 NOTE — ED PROVIDER NOTE - OBJECTIVE STATEMENT
40-year-old female past medical history of left eye chalazion surgery presenting for evaluation of glue in left eye.  Patient states she was driving and trying to apply her tobramycin dexamethasone eyedrops when she accidentally grabbed her nail glue and applied it to her left eye.  Patient left eye and washed it thoroughly before coming to the emergency department.  Denies any vision loss.  Complaining of pain in the left eye.

## 2025-05-27 NOTE — ED PROVIDER NOTE - CLINICAL SUMMARY MEDICAL DECISION MAKING FREE TEXT BOX
See note, I did order C diff toxin    40-year-old female presents to the ED with left eye irritation.  Recently had a upper eyelid chalazion removal.  Patient reports she was driving today and accidentally used nail glue drops into the eye.  Patient irrigated the eye at home.  Came in for evaluation for irritation.  Denies any vision loss.  Some mild pain.  20/20 vision out of both eyes.  Pupils equal and reactive to light.  Lid eversion unremarkable.  Healing to lazy on removal site.  Fluorescein test negative.  Advised continue with eyedrops.  Currently no acute intervention.  Warm compress for irritated eyelids from rubbing.  Discharged home with ophthalmology follow-up.  Has ophthalmology follow-up for tomorrow. 40-year-old female presents to the ED with left eye irritation.  Recently had a upper eyelid chalazion removal.  Patient reports she was driving today and accidentally used nail glue drops into the eye.  Patient irrigated the eye at home.  Came in for evaluation for irritation.  Denies any vision loss.  Some mild pain.  20/20 vision out of both eyes.  Pupils equal and reactive to light.  Lid eversion unremarkable.  Healing to lazy on removal site.  Fluorescein test noted to have a corneal abrasion on the 6 oclock portion.  Advised continue with eyedrops.  Currently no acute intervention.  Warm compress for irritated eyelids from rubbing.  Discharged home with ophthalmology follow-up.  Has ophthalmology follow-up for tomorrow. Patient is already taking tobramycin dexamethasone drops.  Advised to continue applying the drops every 4 hours.  Will follow-up with patient's ophthalmology within next day.

## 2025-05-27 NOTE — ED PROVIDER NOTE - NSFOLLOWUPINSTRUCTIONS_ED_ALL_ED_FT
Eye Foreign Body    WHAT YOU NEED TO KNOW:    You may have pain, sensitivity to light, or blurry vision for a few days.     DISCHARGE INSTRUCTIONS:    Return to the emergency department if:     You suddenly lose your vision.       You have severe eye pain.     Contact your healthcare provider or ophthalmologist if:     You have new or worse eye swelling.       Your symptoms do not get better, even after the foreign body is removed.       You have white or yellow fluid draining from your eye.       You have questions or concerns about your condition or care.     Medicines: You may need any of the following:     Eye drops or eye ointment may be given to prevent an infection and decrease pain.       NSAIDs, such as ibuprofen, help decrease swelling, pain, and fever. NSAIDs can cause stomach bleeding or kidney problems in certain people. If you take blood thinner medicine, always ask your healthcare provider if NSAIDs are safe for you. Always read the medicine label and follow directions.      Prescription pain medicine may be given. Ask your healthcare provider how to take this medicine safely. Some prescription pain medicines contain acetaminophen. Do not take other medicines that contain acetaminophen without talking to your healthcare provider. Too much acetaminophen may cause liver damage. Prescription pain medicine may cause constipation. Ask your healthcare provider how to prevent or treat constipation.       Take your medicine as directed. Contact your healthcare provider if you think your medicine is not helping or if you have side effects. Tell him of her if you are allergic to any medicine. Keep a list of the medicines, vitamins, and herbs you take. Include the amounts, and when and why you take them. Bring the list or the pill bottles to follow-up visits. Carry your medicine list with you in case of an emergency.    Help your eye heal:     Do not rub your eye. This may cause more damage or infection.       Do not wear your contacts lenses until your eye heals. Ask your healthcare provider how long to follow this direction.       Wear sunglasses as directed. Sunglasses help protect the eye and decrease sensitivity to light.     Prevent another EFB:     Wear safety glasses, eye shields, or goggles. These items can prevent eye injury. Make sure the eyewear wraps around the sides of your face. Wear these items while you work with chemicals, metal, wood, or bodily fluids such as blood. Also wear protective eyewear during sports such as racquetball or swimming. Do not use regular eye glasses for eye protection. They will not protect your eyes from foreign bodies or chemicals.       Use contact lenses as directed. Wash your hands before you clean, insert, or remove your contacts. Insert and remove contact lenses correctly. Clean and change your contacts as directed to help prevent eye damage or infection.     Follow up with your healthcare provider or ophthalmologist in 1 to 2 days: Write down your questions so you remember to ask them during your visits.       © Copyright [employer] 2019 All illustrations and images included in CareNotes are the copyrighted property of [employer] or [employer]. Continue taking your antibiotic drops as prescribed by her ophthalmologist and follow-up with your ophthalmologist in the next few days.    Eye Foreign Body    WHAT YOU NEED TO KNOW:    You may have pain, sensitivity to light, or blurry vision for a few days.     DISCHARGE INSTRUCTIONS:    Return to the emergency department if:     You suddenly lose your vision.       You have severe eye pain.     Contact your healthcare provider or ophthalmologist if:     You have new or worse eye swelling.       Your symptoms do not get better, even after the foreign body is removed.       You have white or yellow fluid draining from your eye.       You have questions or concerns about your condition or care.     Medicines: You may need any of the following:     Eye drops or eye ointment may be given to prevent an infection and decrease pain.       NSAIDs, such as ibuprofen, help decrease swelling, pain, and fever. NSAIDs can cause stomach bleeding or kidney problems in certain people. If you take blood thinner medicine, always ask your healthcare provider if NSAIDs are safe for you. Always read the medicine label and follow directions.      Prescription pain medicine may be given. Ask your healthcare provider how to take this medicine safely. Some prescription pain medicines contain acetaminophen. Do not take other medicines that contain acetaminophen without talking to your healthcare provider. Too much acetaminophen may cause liver damage. Prescription pain medicine may cause constipation. Ask your healthcare provider how to prevent or treat constipation.       Take your medicine as directed. Contact your healthcare provider if you think your medicine is not helping or if you have side effects. Tell him of her if you are allergic to any medicine. Keep a list of the medicines, vitamins, and herbs you take. Include the amounts, and when and why you take them. Bring the list or the pill bottles to follow-up visits. Carry your medicine list with you in case of an emergency.    Help your eye heal:     Do not rub your eye. This may cause more damage or infection.       Do not wear your contacts lenses until your eye heals. Ask your healthcare provider how long to follow this direction.       Wear sunglasses as directed. Sunglasses help protect the eye and decrease sensitivity to light.     Prevent another EFB:     Wear safety glasses, eye shields, or goggles. These items can prevent eye injury. Make sure the eyewear wraps around the sides of your face. Wear these items while you work with chemicals, metal, wood, or bodily fluids such as blood. Also wear protective eyewear during sports such as racquetball or swimming. Do not use regular eye glasses for eye protection. They will not protect your eyes from foreign bodies or chemicals.       Use contact lenses as directed. Wash your hands before you clean, insert, or remove your contacts. Insert and remove contact lenses correctly. Clean and change your contacts as directed to help prevent eye damage or infection.     Follow up with your healthcare provider or ophthalmologist in 1 to 2 days: Write down your questions so you remember to ask them during your visits.       © Copyright [employer] 2019 All illustrations and images included in CareNotes are the copyrighted property of [employer] or [employer].

## 2025-08-23 ENCOUNTER — NON-APPOINTMENT (OUTPATIENT)
Age: 40
End: 2025-08-23